# Patient Record
Sex: MALE | Race: WHITE | NOT HISPANIC OR LATINO | Employment: UNEMPLOYED | URBAN - METROPOLITAN AREA
[De-identification: names, ages, dates, MRNs, and addresses within clinical notes are randomized per-mention and may not be internally consistent; named-entity substitution may affect disease eponyms.]

---

## 2020-12-10 ENCOUNTER — NURSE TRIAGE (OUTPATIENT)
Dept: OTHER | Facility: OTHER | Age: 50
End: 2020-12-10

## 2020-12-10 DIAGNOSIS — Z20.822 CLOSE EXPOSURE TO COVID-19 VIRUS: Primary | ICD-10-CM

## 2020-12-11 DIAGNOSIS — Z20.822 CLOSE EXPOSURE TO COVID-19 VIRUS: ICD-10-CM

## 2020-12-11 PROCEDURE — U0003 INFECTIOUS AGENT DETECTION BY NUCLEIC ACID (DNA OR RNA); SEVERE ACUTE RESPIRATORY SYNDROME CORONAVIRUS 2 (SARS-COV-2) (CORONAVIRUS DISEASE [COVID-19]), AMPLIFIED PROBE TECHNIQUE, MAKING USE OF HIGH THROUGHPUT TECHNOLOGIES AS DESCRIBED BY CMS-2020-01-R: HCPCS | Performed by: FAMILY MEDICINE

## 2020-12-12 LAB — SARS-COV-2 RNA SPEC QL NAA+PROBE: NOT DETECTED

## 2021-03-26 DIAGNOSIS — Z23 ENCOUNTER FOR IMMUNIZATION: ICD-10-CM

## 2022-02-18 ENCOUNTER — OFFICE VISIT (OUTPATIENT)
Dept: UROLOGY | Facility: CLINIC | Age: 52
End: 2022-02-18
Payer: COMMERCIAL

## 2022-02-18 VITALS
DIASTOLIC BLOOD PRESSURE: 64 MMHG | BODY MASS INDEX: 31.18 KG/M2 | WEIGHT: 194 LBS | OXYGEN SATURATION: 97 % | SYSTOLIC BLOOD PRESSURE: 122 MMHG | HEIGHT: 66 IN | HEART RATE: 66 BPM | RESPIRATION RATE: 18 BRPM

## 2022-02-18 DIAGNOSIS — R97.20 ELEVATED PSA: Primary | ICD-10-CM

## 2022-02-18 PROCEDURE — 99203 OFFICE O/P NEW LOW 30 MIN: CPT | Performed by: PHYSICIAN ASSISTANT

## 2022-02-18 RX ORDER — OMEPRAZOLE 10 MG/1
10 CAPSULE, DELAYED RELEASE ORAL DAILY
COMMUNITY

## 2022-02-18 RX ORDER — MOMETASONE FUROATE 1 MG/G
CREAM TOPICAL AS NEEDED
COMMUNITY
Start: 2022-01-05

## 2022-02-18 NOTE — PROGRESS NOTES
1  Elevated PSA  PSA, total and free    PSA Total, Diagnostic     Assessment and plan:     1  Elevated PSA  - normal CARLOS  - repeat PSA - if elevated, then proceed with biopsy  Risks reviewed  If returns to normal, f/u 6 months repeat PSA for continued monitoring  Abdulaziz Rodriguez PA-C      Chief Complaint     Elevated PSA    History of Present Illness     Ap Hendrickson is a 54 y o  male presenting today as a new patient for elevated PSA  LUTS normal - no dysuria, gross hematuria, infection  Denies any family history of  malignancies  PSA 6 99 (1/18/22)  No previous PSA for comparison    Review of Systems     Review of Systems   Constitutional: Negative for activity change, appetite change, chills, diaphoresis, fatigue, fever and unexpected weight change  Respiratory: Negative for chest tightness and shortness of breath  Cardiovascular: Negative for chest pain, palpitations and leg swelling  Gastrointestinal: Negative for abdominal distention, abdominal pain, constipation, diarrhea, nausea and vomiting  Genitourinary: Negative for decreased urine volume, difficulty urinating, dysuria, enuresis, flank pain, frequency, genital sores, hematuria and urgency  Musculoskeletal: Negative for back pain, gait problem and myalgias  Skin: Negative for color change, pallor, rash and wound  Psychiatric/Behavioral: Negative for behavioral problems  The patient is not nervous/anxious            AUA SYMPTOM SCORE      Most Recent Value   AUA SYMPTOM SCORE    How often have you had a sensation of not emptying your bladder completely after you finished urinating? 0 (P)     How often have you had to urinate again less than two hours after you finished urinating? 1 (P)     How often have you found you stopped and started again several times when you urinate? 0 (P)     How often have you found it difficult to postpone urination? 0 (P)     How often have you had a weak urinary stream? 0 (P)     How often have you had to push or strain to begin urination? 0 (P)     How many times did you most typically get up to urinate from the time you went to bed at night until the time you got up in the morning? 1 (P)     Quality of Life: If you were to spend the rest of your life with your urinary condition just the way it is now, how would you feel about that? 0 (P)     AUA SYMPTOM SCORE 2 (P)           Allergies     No Known Allergies    Physical Exam     Physical Exam  Constitutional:       General: He is not in acute distress  Appearance: Normal appearance  He is normal weight  He is not ill-appearing, toxic-appearing or diaphoretic  HENT:      Head: Normocephalic and atraumatic  Eyes:      General:         Right eye: No discharge  Left eye: No discharge  Conjunctiva/sclera: Conjunctivae normal    Pulmonary:      Effort: Pulmonary effort is normal  No respiratory distress  Genitourinary:     Comments: Good rectal tone  Prostate 50g, smooth, no nodule  Musculoskeletal:         General: No swelling or tenderness  Normal range of motion  Skin:     General: Skin is warm and dry  Coloration: Skin is not jaundiced or pale  Neurological:      General: No focal deficit present  Mental Status: He is alert and oriented to person, place, and time  Psychiatric:         Mood and Affect: Mood normal          Behavior: Behavior normal          Thought Content: Thought content normal            Vital Signs     Vitals:    02/18/22 1123   BP: 122/64   Pulse: 66   Resp: 18   SpO2: 97%   Weight: 88 kg (194 lb)   Height: 5' 6" (1 676 m)         Current Medications       Current Outpatient Medications:     mometasone (ELOCON) 0 1 % cream, as needed  , Disp: , Rfl:     omeprazole (PriLOSEC) 10 mg delayed release capsule, Take 10 mg by mouth daily, Disp: , Rfl:       Active Problems     There is no problem list on file for this patient          Past Medical History     Past Medical History:   Diagnosis Date  Elevated PSA          Surgical History     History reviewed  No pertinent surgical history  Family History     History reviewed  No pertinent family history        Social History     Social History       Radiology

## 2022-03-06 ENCOUNTER — TELEPHONE (OUTPATIENT)
Dept: UROLOGY | Facility: CLINIC | Age: 52
End: 2022-03-06

## 2022-03-06 DIAGNOSIS — R97.20 ELEVATED PSA: Primary | ICD-10-CM

## 2022-03-06 LAB
PSA FREE MFR SERPL: 15 % (CALC)
PSA FREE SERPL-MCNC: 1.1 NG/ML
PSA SERPL-MCNC: 7.5 NG/ML

## 2022-03-06 RX ORDER — CIPROFLOXACIN 500 MG/1
500 TABLET, FILM COATED ORAL 2 TIMES DAILY
Qty: 2 TABLET | Refills: 0 | Status: SHIPPED | OUTPATIENT
Start: 2022-03-06 | End: 2022-03-07

## 2022-03-06 NOTE — TELEPHONE ENCOUNTER
Please let patient know PSA has risen further to 7 5 with a low percent free PSA  Needs a biopsy within the next few weeks  Risks already reviewed at his consultation  I will send antibiotic and enema to pharmacy  Please coordinate  Thank you

## 2022-03-07 NOTE — TELEPHONE ENCOUNTER
I scheduled pt for 4/1 and lm for pt to c/b and confirm  Please assist with scheduling the results apt

## 2022-03-07 NOTE — TELEPHONE ENCOUNTER
Called pt left vm injection 730am, bx at 8am on 4/1  Then on 4/18 @ 330pm bx results  Left all this information on voicemail

## 2022-03-07 NOTE — TELEPHONE ENCOUNTER
Pt returned call, unable to sched him for a biopsy soon, please advise on appt, pt prefers [facility] [place] office  Dr Perla Kessler has 04/01/22 at [place], not sure if this is too far out   Pt can be reached at [phone]

## 2022-03-07 NOTE — TELEPHONE ENCOUNTER
Unable to speak directly with patient  A generic message was left for patient to call off back  WHEN PATIENT RETURNS CALLS PLEASE ASSIST WITH THE FOLLOWING:  Alisa Longoria PA-C Yesterday (6:52 AM)        Please let patient know PSA has risen further to 7 5 with a low percent free PSA  Needs a biopsy within the next few weeks  Risks already reviewed at his consultation  I will send antibiotic and enema to pharmacy  Please coordinate  Thank you

## 2022-03-09 NOTE — TELEPHONE ENCOUNTER
Called and spoke with patient  Explained the procedure and explained the antibiotic that he take in the morning and then 12 hours later along with an enema in the morning to remove the stool burden  He understands  Mailed information on bx for him to also have and review

## 2022-03-31 ENCOUNTER — PROCEDURE VISIT (OUTPATIENT)
Dept: UROLOGY | Facility: AMBULATORY SURGERY CENTER | Age: 52
End: 2022-03-31
Payer: COMMERCIAL

## 2022-03-31 VITALS
SYSTOLIC BLOOD PRESSURE: 140 MMHG | WEIGHT: 193 LBS | DIASTOLIC BLOOD PRESSURE: 88 MMHG | HEART RATE: 60 BPM | BODY MASS INDEX: 31.02 KG/M2 | HEIGHT: 66 IN

## 2022-03-31 DIAGNOSIS — Z30.2 ENCOUNTER FOR STERILIZATION: ICD-10-CM

## 2022-03-31 DIAGNOSIS — R97.20 ELEVATED PSA: Primary | ICD-10-CM

## 2022-03-31 DIAGNOSIS — N20.0 CALCULUS OF KIDNEY: ICD-10-CM

## 2022-03-31 PROCEDURE — G0416 PROSTATE BIOPSY, ANY MTHD: HCPCS | Performed by: SPECIALIST

## 2022-03-31 PROCEDURE — 55700 PR BIOPSY OF PROSTATE,NEEDLE/PUNCH: CPT | Performed by: UROLOGY

## 2022-03-31 PROCEDURE — 88344 IMHCHEM/IMCYTCHM EA MLT ANTB: CPT | Performed by: SPECIALIST

## 2022-03-31 PROCEDURE — 96372 THER/PROPH/DIAG INJ SC/IM: CPT

## 2022-03-31 PROCEDURE — 76942 ECHO GUIDE FOR BIOPSY: CPT | Performed by: UROLOGY

## 2022-03-31 RX ORDER — CEFTRIAXONE 1 G/1
1000 INJECTION, POWDER, FOR SOLUTION INTRAMUSCULAR; INTRAVENOUS ONCE
Status: COMPLETED | OUTPATIENT
Start: 2022-03-31 | End: 2022-03-31

## 2022-03-31 RX ADMIN — CEFTRIAXONE 1000 MG: 1 INJECTION, POWDER, FOR SOLUTION INTRAMUSCULAR; INTRAVENOUS at 12:20

## 2022-03-31 NOTE — PROGRESS NOTES
Biopsy prostate     Date/Time 3/31/2022 1:10 PM     Performed by  Soha Johnson MD     Authorized by Soha Johnsno MD           Jim Gutierrez is a 44-year-old male referred for a PSA of 6 99 from January of 2022  A repeat PSA is noted to be 7 5 from March of 2022  He presents today to undergo transrectal ultrasound-guided biopsy the prostate  He denies any family history of prostate cancer  He denies any lower urinary tract symptoms  Prostate Biopsy note: The patient returns to the office today to undergo a transrectal ultrasound-guided biopsy of the prostate secondary to an elevated PSA  Risk and benefits of the procedure were discussed  Informed consent was obtained  The patient's prebiopsy preparation was deemed to be adequate  Antibiotics had been taken as prescribed  If appropriate blood thinners had been placed on hold  The patient completed an enema as prescribed  The patient was placed in the lateral decubitus position  Digital rectal examination was performed revealing a firm 50 gram prostate with increased induration along the left side  Viscous lidocaine jelly was instilled into the rectum  Transrectal ultrasonography was then performed  The prostate measured 40 grams  5 cc of 2% lidocaine were then injected bilaterally between the junction of the base of the prostate and the seminal vesicles  Ultrasound guidance was utilized to place the needle into proper position for the administration of the local anesthetic  A standard 12 core biopsy was then performed  Three cores were taken from each peripheral zone and 3 cores from each central zone bilaterally  Overall the patient tolerated the procedure and there were no complications  My overall impression status post transrectal ultrasound and biopsy the prostate secondary to an elevated PSA  I have recommended rest and avoiding heavy lifting or exertion for the next 48 hours    Possible postbiopsy sequelae were discussed including hematuria, blood per rectum, and hematospermia  We discussed that the hematospermia can often take a prolonged period of time to resolve  He will return in the next one to 2 weeks to review the results of the biopsy

## 2022-04-18 ENCOUNTER — OFFICE VISIT (OUTPATIENT)
Dept: UROLOGY | Facility: AMBULATORY SURGERY CENTER | Age: 52
End: 2022-04-18
Payer: COMMERCIAL

## 2022-04-18 VITALS
HEIGHT: 66 IN | SYSTOLIC BLOOD PRESSURE: 134 MMHG | HEART RATE: 68 BPM | DIASTOLIC BLOOD PRESSURE: 82 MMHG | BODY MASS INDEX: 31.34 KG/M2 | WEIGHT: 195 LBS

## 2022-04-18 DIAGNOSIS — C61 PROSTATE CANCER (HCC): Primary | ICD-10-CM

## 2022-04-18 PROCEDURE — 99214 OFFICE O/P EST MOD 30 MIN: CPT | Performed by: UROLOGY

## 2022-04-18 NOTE — H&P (VIEW-ONLY)
4/18/2022    Brand Kappa  1970  [id number]        Assessment  New diagnosis Jessee 3+4=7/10 prostate cancer      Discussion  I had a lengthy discussion with the patient and his wife in the office today regarding his 8 of 12 cores positive for a mix of [place] 6 and [place] 3 + 4 disease  His 3+4 disease is located exclusively on the left side of the prostate  At 54years of age I recommend definitive treatment of his [place] 7 prostate cancer with robot assisted laparoscopic prostatectomy  Although radiation therapy is an option, at 54years of age I would strongly recommend surgical extirpation of the prostate  We discussed that if he has surgery upfront and were to have disease recurrence in the future he could always receive adjuvant radiation therapy  If he were to receive radiation at this time and had disease recurrence in the future this would make surgery extremely difficult if not impossible to safely performed  The patient will way all of his options including the option for a 2nd opinion which we discussed in the office today  We will be in touch with the patient within the next week regarding his decision  History of Present Illness  54 y o  male with a history of an elevated PSA 7 5  This prompted transrectal ultrasound-guided biopsy the prostate which was recently performed  Eight of 12 cores were noted to be positive  Multiple cores of [place] 3 + 4 disease were positive on the left side of the prostate  The right side of the prostate has only [place] 6 disease present  He returns to the office today with his wife for discussion regarding his new diagnosis of [place] 7 prostate cancer  He denies any prior history of abdominal surgery  He states that he is otherwise in good health      AUA Symptom Score  AUA SYMPTOM SCORE      Most Recent Value   AUA SYMPTOM SCORE    How often have you had a sensation of not emptying your bladder completely after you finished urinating? 0 (P) How often have you had to urinate again less than two hours after you finished urinating? 1 (P)     How often have you found you stopped and started again several times when you urinate? 0 (P)     How often have you found it difficult to postpone urination? 0 (P)     How often have you had a weak urinary stream? 0 (P)     How often have you had to push or strain to begin urination? 0 (P)     How many times did you most typically get up to urinate from the time you went to bed at night until the time you got up in the morning? 1 (P)     Quality of Life: If you were to spend the rest of your life with your urinary condition just the way it is now, how would you feel about that? 0 (P)     AUA SYMPTOM SCORE 2 (P)           Review of Systems  Review of Systems   Constitutional: Negative  HENT: Negative  Eyes: Negative  Respiratory: Negative  Cardiovascular: Negative  Gastrointestinal: Negative  Endocrine: Negative  Genitourinary:        Per HPI   Musculoskeletal: Negative  Skin: Negative  Allergic/Immunologic: Negative  Neurological: Negative  Hematological: Negative  Psychiatric/Behavioral: Negative  Past Medical History  Past Medical History:   Diagnosis Date    Elevated PSA        Past Social History  History reviewed  No pertinent surgical history  Past Family History  History reviewed  No pertinent family history      Past Social history  Social History     Socioeconomic History    Marital status: Single     Spouse name: Not on file    Number of children: Not on file    Years of education: Not on file    Highest education level: Not on file   Occupational History    Not on file   Tobacco Use    Smoking status: Never Smoker    Smokeless tobacco: Never Used   Vaping Use    Vaping Use: Never used   Substance and Sexual Activity    Alcohol use: Yes     Comment: occasionally    Drug use: Never    Sexual activity: Not on file   Other Topics Concern    Not on file   Social History Narrative    Not on file     Social Determinants of Health     Financial Resource Strain: Not on file   Food Insecurity: Not on file   Transportation Needs: Not on file   Physical Activity: Not on file   Stress: Not on file   Social Connections: Not on file   Intimate Partner Violence: Not on file   Housing Stability: Not on file       Current Medications  Current Outpatient Medications   Medication Sig Dispense Refill    mometasone (ELOCON) 0 1 % cream as needed        omeprazole (PriLOSEC) 10 mg delayed release capsule Take 10 mg by mouth daily      bisacodyl (FLEET) 10 MG/30ML ENEM Insert 30 mL (10 mg total) into the rectum once for 1 dose Perform morning of biopsy (Patient not taking: Reported on 4/18/2022 ) 30 mL 0     No current facility-administered medications for this visit  Allergies  No Known Allergies    Past Medical History, Social History, Family History, medications and allergies were reviewed  Vitals  Vitals:    04/18/22 1536   BP: 134/82   BP Location: Left arm   Patient Position: Sitting   Cuff Size: Adult   Pulse: 68   Weight: 88 5 kg (195 lb)   Height: 5' 6" (1 676 m)       Physical Exam  Physical Exam  On examination he is in no acute distress  Gait normal   Affect normal      Results  Lab Results   Component Value Date    PSA 7 5 (H) 03/05/2022     No results found for: GLUCOSE, CALCIUM, NA, K, CO2, CL, BUN, CREATININE  No results found for: WBC, HGB, HCT, MCV, PLT      Office Urine Dip  No results found for this or any previous visit (from the past 1 hour(s))  ]      Total visit time was 30 minutes of which over 50% was spent on counseling

## 2022-04-18 NOTE — PROGRESS NOTES
4/18/2022    Emmit Quivers  1970  [id number]        Assessment  New diagnosis [place] 3+4=7/10 prostate cancer      Discussion  I had a lengthy discussion with the patient and his wife in the office today regarding his 8 of 12 cores positive for a mix of [place] 6 and [place] 3 + 4 disease  His 3+4 disease is located exclusively on the left side of the prostate  At 54years of age I recommend definitive treatment of his [place] 7 prostate cancer with robot assisted laparoscopic prostatectomy  Although radiation therapy is an option, at 54years of age I would strongly recommend surgical extirpation of the prostate  We discussed that if he has surgery upfront and were to have disease recurrence in the future he could always receive adjuvant radiation therapy  If he were to receive radiation at this time and had disease recurrence in the future this would make surgery extremely difficult if not impossible to safely performed  The patient will way all of his options including the option for a 2nd opinion which we discussed in the office today  We will be in touch with the patient within the next week regarding his decision  History of Present Illness  54 y o  male with a history of an elevated PSA 7 5  This prompted transrectal ultrasound-guided biopsy the prostate which was recently performed  Eight of 12 cores were noted to be positive  Multiple cores of [place] 3 + 4 disease were positive on the left side of the prostate  The right side of the prostate has only [place] 6 disease present  He returns to the office today with his wife for discussion regarding his new diagnosis of [place] 7 prostate cancer  He denies any prior history of abdominal surgery  He states that he is otherwise in good health      AUA Symptom Score  AUA SYMPTOM SCORE      Most Recent Value   AUA SYMPTOM SCORE    How often have you had a sensation of not emptying your bladder completely after you finished urinating? 0 (P) How often have you had to urinate again less than two hours after you finished urinating? 1 (P)     How often have you found you stopped and started again several times when you urinate? 0 (P)     How often have you found it difficult to postpone urination? 0 (P)     How often have you had a weak urinary stream? 0 (P)     How often have you had to push or strain to begin urination? 0 (P)     How many times did you most typically get up to urinate from the time you went to bed at night until the time you got up in the morning? 1 (P)     Quality of Life: If you were to spend the rest of your life with your urinary condition just the way it is now, how would you feel about that? 0 (P)     AUA SYMPTOM SCORE 2 (P)           Review of Systems  Review of Systems   Constitutional: Negative  HENT: Negative  Eyes: Negative  Respiratory: Negative  Cardiovascular: Negative  Gastrointestinal: Negative  Endocrine: Negative  Genitourinary:        Per HPI   Musculoskeletal: Negative  Skin: Negative  Allergic/Immunologic: Negative  Neurological: Negative  Hematological: Negative  Psychiatric/Behavioral: Negative  Past Medical History  Past Medical History:   Diagnosis Date    Elevated PSA        Past Social History  History reviewed  No pertinent surgical history  Past Family History  History reviewed  No pertinent family history      Past Social history  Social History     Socioeconomic History    Marital status: Single     Spouse name: Not on file    Number of children: Not on file    Years of education: Not on file    Highest education level: Not on file   Occupational History    Not on file   Tobacco Use    Smoking status: Never Smoker    Smokeless tobacco: Never Used   Vaping Use    Vaping Use: Never used   Substance and Sexual Activity    Alcohol use: Yes     Comment: occasionally    Drug use: Never    Sexual activity: Not on file   Other Topics Concern    Not on file   Social History Narrative    Not on file     Social Determinants of Health     Financial Resource Strain: Not on file   Food Insecurity: Not on file   Transportation Needs: Not on file   Physical Activity: Not on file   Stress: Not on file   Social Connections: Not on file   Intimate Partner Violence: Not on file   Housing Stability: Not on file       Current Medications  Current Outpatient Medications   Medication Sig Dispense Refill    mometasone (ELOCON) 0 1 % cream as needed        omeprazole (PriLOSEC) 10 mg delayed release capsule Take 10 mg by mouth daily      bisacodyl (FLEET) 10 MG/30ML ENEM Insert 30 mL (10 mg total) into the rectum once for 1 dose Perform morning of biopsy (Patient not taking: Reported on 4/18/2022 ) 30 mL 0     No current facility-administered medications for this visit  Allergies  No Known Allergies    Past Medical History, Social History, Family History, medications and allergies were reviewed  Vitals  Vitals:    04/18/22 1536   BP: 134/82   BP Location: Left arm   Patient Position: Sitting   Cuff Size: Adult   Pulse: 68   Weight: 88 5 kg (195 lb)   Height: 5' 6" (1 676 m)       Physical Exam  Physical Exam  On examination he is in no acute distress  Gait normal   Affect normal      Results  Lab Results   Component Value Date    PSA 7 5 (H) 03/05/2022     No results found for: GLUCOSE, CALCIUM, NA, K, CO2, CL, BUN, CREATININE  No results found for: WBC, HGB, HCT, MCV, PLT      Office Urine Dip  No results found for this or any previous visit (from the past 1 hour(s))  ]      Total visit time was 30 minutes of which over 50% was spent on counseling

## 2022-04-19 ENCOUNTER — TELEPHONE (OUTPATIENT)
Dept: UROLOGY | Facility: MEDICAL CENTER | Age: 52
End: 2022-04-19

## 2022-04-19 ENCOUNTER — PREP FOR PROCEDURE (OUTPATIENT)
Dept: UROLOGY | Facility: AMBULATORY SURGERY CENTER | Age: 52
End: 2022-04-19

## 2022-04-19 DIAGNOSIS — Z01.812 PRE-PROCEDURE LAB EXAM: ICD-10-CM

## 2022-04-19 DIAGNOSIS — Z79.01 LONG TERM (CURRENT) USE OF ANTICOAGULANTS: ICD-10-CM

## 2022-04-19 DIAGNOSIS — Z11.59 SCREENING FOR VIRAL DISEASE: ICD-10-CM

## 2022-04-19 DIAGNOSIS — C61 PROSTATE CANCER (HCC): Primary | ICD-10-CM

## 2022-04-19 DIAGNOSIS — Z01.818 PREOP EXAMINATION: ICD-10-CM

## 2022-04-19 RX ORDER — SODIUM CHLORIDE 9 MG/ML
125 INJECTION, SOLUTION INTRAVENOUS CONTINUOUS
Status: CANCELLED | OUTPATIENT
Start: 2022-04-19

## 2022-04-19 RX ORDER — CEFAZOLIN SODIUM 2 G/50ML
2000 SOLUTION INTRAVENOUS ONCE
Status: CANCELLED | OUTPATIENT
Start: 2022-04-19 | End: 2022-04-19

## 2022-04-19 NOTE — TELEPHONE ENCOUNTER
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

## 2022-04-19 NOTE — TELEPHONE ENCOUNTER
I called pt this afternoon to discuss scheduling his RALP with Dr Zo Cantor at the [facility] on 4/27/2022  Pt confirmed that this will work for him  Pt will also aware that he will need need to have his pre op labs and COVID test done as soon as possible  Pt will be stopping by our [place] [id number] office tomorrow to  his surgical paperwork and he was instructed to call our office with any questions or concerns regarding this procedure

## 2022-04-20 ENCOUNTER — APPOINTMENT (OUTPATIENT)
Dept: LAB | Age: 52
End: 2022-04-20
Payer: COMMERCIAL

## 2022-04-20 ENCOUNTER — TELEPHONE (OUTPATIENT)
Dept: UROLOGY | Facility: MEDICAL CENTER | Age: 52
End: 2022-04-20

## 2022-04-20 DIAGNOSIS — Z01.818 PREOP EXAMINATION: ICD-10-CM

## 2022-04-20 DIAGNOSIS — Z79.01 LONG TERM (CURRENT) USE OF ANTICOAGULANTS: ICD-10-CM

## 2022-04-20 DIAGNOSIS — Z01.812 PRE-PROCEDURE LAB EXAM: ICD-10-CM

## 2022-04-20 DIAGNOSIS — C61 PROSTATE CANCER (HCC): ICD-10-CM

## 2022-04-20 DIAGNOSIS — Z11.59 SCREENING FOR VIRAL DISEASE: ICD-10-CM

## 2022-04-20 PROCEDURE — 85610 PROTHROMBIN TIME: CPT

## 2022-04-20 PROCEDURE — 36415 COLL VENOUS BLD VENIPUNCTURE: CPT

## 2022-04-20 PROCEDURE — 85025 COMPLETE CBC W/AUTO DIFF WBC: CPT

## 2022-04-20 PROCEDURE — U0005 INFEC AGEN DETEC AMPLI PROBE: HCPCS

## 2022-04-20 PROCEDURE — 86920 COMPATIBILITY TEST SPIN: CPT

## 2022-04-20 PROCEDURE — U0003 INFECTIOUS AGENT DETECTION BY NUCLEIC ACID (DNA OR RNA); SEVERE ACUTE RESPIRATORY SYNDROME CORONAVIRUS 2 (SARS-COV-2) (CORONAVIRUS DISEASE [COVID-19]), AMPLIFIED PROBE TECHNIQUE, MAKING USE OF HIGH THROUGHPUT TECHNOLOGIES AS DESCRIBED BY CMS-2020-01-R: HCPCS

## 2022-04-20 PROCEDURE — 86850 RBC ANTIBODY SCREEN: CPT | Performed by: UROLOGY

## 2022-04-20 PROCEDURE — 85730 THROMBOPLASTIN TIME PARTIAL: CPT

## 2022-04-20 PROCEDURE — 86900 BLOOD TYPING SEROLOGIC ABO: CPT | Performed by: UROLOGY

## 2022-04-20 PROCEDURE — 86901 BLOOD TYPING SEROLOGIC RH(D): CPT | Performed by: UROLOGY

## 2022-04-20 PROCEDURE — 80048 BASIC METABOLIC PNL TOTAL CA: CPT

## 2022-04-20 NOTE — TELEPHONE ENCOUNTER
I returned call to pt but there was no answer  I left another voicemail asking pt to call our office back to discuss his questions

## 2022-04-20 NOTE — TELEPHONE ENCOUNTER
Patient called stating he wanted to speak to Surgical Scheduler       Patient can be reached [phone] (H)

## 2022-04-20 NOTE — TELEPHONE ENCOUNTER
DUPLICATE ENCOUNTER  PLEASE SEE OTHER ENCOUNTER FROM 4/19 TO UPDATE FOR UPCOMING SURGICAL PROCEDURE

## 2022-04-20 NOTE — TELEPHONE ENCOUNTER
Reshma Rouse routed conversation to You 1 hour ago (11:37 AM)     [facility] [phone]  Reshma Rouse 1 hour ago (11:37 AM)     Reshma Rouse 1 hour ago (11:36 AM)     [place]       Pt called to return  for Arelis     Pt call [facility]-[phone]               Documentation

## 2022-04-20 NOTE — TELEPHONE ENCOUNTER
I returned pt 's phone call as requested  There was no answer so I did leave a voicemail asking pt to call our office back if he still has questions regarding his upcoming procedure

## 2022-04-21 ENCOUNTER — LAB REQUISITION (OUTPATIENT)
Dept: LAB | Facility: HOSPITAL | Age: 52
End: 2022-04-21
Payer: COMMERCIAL

## 2022-04-21 DIAGNOSIS — C61 MALIGNANT NEOPLASM OF PROSTATE (HCC): ICD-10-CM

## 2022-04-21 LAB
ABO GROUP BLD: NORMAL
ANION GAP SERPL CALCULATED.3IONS-SCNC: 1 MMOL/L (ref 4–13)
APTT PPP: 32 SECONDS (ref 23–37)
BASOPHILS # BLD AUTO: 0.05 THOUSANDS/ΜL (ref 0–0.1)
BASOPHILS NFR BLD AUTO: 1 % (ref 0–1)
BLD GP AB SCN SERPL QL: NEGATIVE
BUN SERPL-MCNC: 19 MG/DL (ref 5–25)
CALCIUM SERPL-MCNC: 9.3 MG/DL (ref 8.3–10.1)
CHLORIDE SERPL-SCNC: 107 MMOL/L (ref 100–108)
CO2 SERPL-SCNC: 30 MMOL/L (ref 21–32)
CREAT SERPL-MCNC: 1.06 MG/DL (ref 0.6–1.3)
EOSINOPHIL # BLD AUTO: 0.1 THOUSAND/ΜL (ref 0–0.61)
EOSINOPHIL NFR BLD AUTO: 1 % (ref 0–6)
ERYTHROCYTE [DISTWIDTH] IN BLOOD BY AUTOMATED COUNT: 12 % (ref 11.6–15.1)
GFR SERPL CREATININE-BSD FRML MDRD: 82 ML/MIN/1.73SQ M
GLUCOSE SERPL-MCNC: 86 MG/DL (ref 65–140)
HCT VFR BLD AUTO: 42.3 % (ref 36.5–49.3)
HGB BLD-MCNC: 14.3 G/DL (ref 12–17)
IMM GRANULOCYTES # BLD AUTO: 0.03 THOUSAND/UL (ref 0–0.2)
IMM GRANULOCYTES NFR BLD AUTO: 0 % (ref 0–2)
INR PPP: 0.99 (ref 0.84–1.19)
LYMPHOCYTES # BLD AUTO: 3.21 THOUSANDS/ΜL (ref 0.6–4.47)
LYMPHOCYTES NFR BLD AUTO: 33 % (ref 14–44)
MCH RBC QN AUTO: 28.9 PG (ref 26.8–34.3)
MCHC RBC AUTO-ENTMCNC: 33.8 G/DL (ref 31.4–37.4)
MCV RBC AUTO: 86 FL (ref 82–98)
MONOCYTES # BLD AUTO: 0.84 THOUSAND/ΜL (ref 0.17–1.22)
MONOCYTES NFR BLD AUTO: 9 % (ref 4–12)
NEUTROPHILS # BLD AUTO: 5.57 THOUSANDS/ΜL (ref 1.85–7.62)
NEUTS SEG NFR BLD AUTO: 56 % (ref 43–75)
NRBC BLD AUTO-RTO: 0 /100 WBCS
PLATELET # BLD AUTO: 242 THOUSANDS/UL (ref 149–390)
PMV BLD AUTO: 10.2 FL (ref 8.9–12.7)
POTASSIUM SERPL-SCNC: 4 MMOL/L (ref 3.5–5.3)
PROTHROMBIN TIME: 12.7 SECONDS (ref 11.6–14.5)
RBC # BLD AUTO: 4.94 MILLION/UL (ref 3.88–5.62)
RH BLD: POSITIVE
SARS-COV-2 RNA RESP QL NAA+PROBE: NEGATIVE
SODIUM SERPL-SCNC: 138 MMOL/L (ref 136–145)
SPECIMEN EXPIRATION DATE: NORMAL
WBC # BLD AUTO: 9.8 THOUSAND/UL (ref 4.31–10.16)

## 2022-04-25 ENCOUNTER — TELEPHONE (OUTPATIENT)
Dept: UROLOGY | Facility: AMBULATORY SURGERY CENTER | Age: 52
End: 2022-04-25

## 2022-04-25 NOTE — TELEPHONE ENCOUNTER
Savanna Carrillo routed conversation to You 35 minutes ago (8:42 AM)     Savanna Carrillo 35 minutes ago (8:42 AM)     KR    Summary: Surgery Question       PT under care of: Dr Jo Fields     Pt has surgery on 04/27/22     PT calling today because/symptoms are: patient's wife has some questions about the surgery  She wants to know what the cost is for insurance and she wants to  Know if there are any out of pocket expenses and if so approximately how much       PT can be reached at: Charles Cordova can be reached at [phone]               Documentation      Cherie Clark 36 minutes ago (8:41 AM)

## 2022-04-25 NOTE — TELEPHONE ENCOUNTER
PT under care of: Dr Mallory Alba     Pt has surgery on 04/27/22     PT calling today because/symptoms are: patient's wife has some questions about the surgery  She wants to know what the cost is for insurance and she wants to  Know if there are any out of pocket expenses and if so approximately how much       PT can be reached at: Sunil Ackerman can be reached at [phone]

## 2022-04-25 NOTE — PRE-PROCEDURE INSTRUCTIONS
Pre-Surgery Instructions:   Medication Instructions    bisacodyl (FLEET) 10 MG/30ML ENEM Instructions from surgeon office    mometasone (ELOCON) 0 1 % cream Do not use night before or DOS    Multiple Vitamin (MULTIVITAMIN ADULT PO) Last dose 4/18    omeprazole (PriLOSEC) 10 mg delayed release capsule Ok to take DOS with sips of water       Spoke with pt via phone, COVID screening negative  Advised hospital location will call with arrival time night before surgery and NPO after midnight night before  Advised one vaccinated visitor is allowed to accompany pt to wait during the procedure  Instructed to leave contacts/jewelery/valuables at home  Instructed to avoid all ASA and OTC Vit/Supp  Tylenol ok to take prn  Reviewed above medication instructions, showering instructions and prostatectomy class, including delgado care and IS  Patient verbalized understanding of all of the above

## 2022-04-27 ENCOUNTER — ANESTHESIA EVENT (OUTPATIENT)
Dept: PERIOP | Facility: HOSPITAL | Age: 52
End: 2022-04-27
Payer: COMMERCIAL

## 2022-04-27 ENCOUNTER — ANESTHESIA (OUTPATIENT)
Dept: PERIOP | Facility: HOSPITAL | Age: 52
End: 2022-04-27
Payer: COMMERCIAL

## 2022-04-27 ENCOUNTER — HOSPITAL ENCOUNTER (OUTPATIENT)
Facility: HOSPITAL | Age: 52
Setting detail: OUTPATIENT SURGERY
Discharge: HOME | End: 2022-04-28
Attending: UROLOGY | Admitting: UROLOGY
Payer: COMMERCIAL

## 2022-04-27 DIAGNOSIS — C61 PROSTATE CANCER (HCC): ICD-10-CM

## 2022-04-27 LAB
ABO GROUP BLD: NORMAL
ANION GAP SERPL CALCULATED.3IONS-SCNC: 2 MMOL/L (ref 4–13)
BUN SERPL-MCNC: 14 MG/DL (ref 5–25)
CALCIUM SERPL-MCNC: 8.4 MG/DL (ref 8.3–10.1)
CHLORIDE SERPL-SCNC: 110 MMOL/L (ref 100–108)
CO2 SERPL-SCNC: 27 MMOL/L (ref 21–32)
CREAT SERPL-MCNC: 1.11 MG/DL (ref 0.6–1.3)
ERYTHROCYTE [DISTWIDTH] IN BLOOD BY AUTOMATED COUNT: 12 % (ref 11.6–15.1)
GFR SERPL CREATININE-BSD FRML MDRD: 77 ML/MIN/1.73SQ M
GLUCOSE P FAST SERPL-MCNC: 136 MG/DL (ref 65–99)
GLUCOSE SERPL-MCNC: 136 MG/DL (ref 65–140)
HCT VFR BLD AUTO: 41.6 % (ref 36.5–49.3)
HGB BLD-MCNC: 13.6 G/DL (ref 12–17)
MCH RBC QN AUTO: 29.4 PG (ref 26.8–34.3)
MCHC RBC AUTO-ENTMCNC: 32.7 G/DL (ref 31.4–37.4)
MCV RBC AUTO: 90 FL (ref 82–98)
PLATELET # BLD AUTO: 218 THOUSANDS/UL (ref 149–390)
PMV BLD AUTO: 8.9 FL (ref 8.9–12.7)
POTASSIUM SERPL-SCNC: 4.2 MMOL/L (ref 3.5–5.3)
RBC # BLD AUTO: 4.62 MILLION/UL (ref 3.88–5.62)
RH BLD: POSITIVE
SODIUM SERPL-SCNC: 139 MMOL/L (ref 136–145)
WBC # BLD AUTO: 27.22 THOUSAND/UL (ref 4.31–10.16)

## 2022-04-27 PROCEDURE — 88309 TISSUE EXAM BY PATHOLOGIST: CPT | Performed by: SPECIALIST

## 2022-04-27 PROCEDURE — 55866 LAPS SURG PRST8ECT RPBIC RAD: CPT | Performed by: UROLOGY

## 2022-04-27 PROCEDURE — 88341 IMHCHEM/IMCYTCHM EA ADD ANTB: CPT | Performed by: SPECIALIST

## 2022-04-27 PROCEDURE — 38571 LAPAROSCOPY LYMPHADENECTOMY: CPT | Performed by: PHYSICIAN ASSISTANT

## 2022-04-27 PROCEDURE — 88342 IMHCHEM/IMCYTCHM 1ST ANTB: CPT | Performed by: SPECIALIST

## 2022-04-27 PROCEDURE — S2900 ROBOTIC SURGICAL SYSTEM: HCPCS | Performed by: UROLOGY

## 2022-04-27 PROCEDURE — 85027 COMPLETE CBC AUTOMATED: CPT | Performed by: UROLOGY

## 2022-04-27 PROCEDURE — 88305 TISSUE EXAM BY PATHOLOGIST: CPT | Performed by: SPECIALIST

## 2022-04-27 PROCEDURE — 55866 LAPS SURG PRST8ECT RPBIC RAD: CPT | Performed by: PHYSICIAN ASSISTANT

## 2022-04-27 PROCEDURE — 80048 BASIC METABOLIC PNL TOTAL CA: CPT | Performed by: UROLOGY

## 2022-04-27 PROCEDURE — 38571 LAPAROSCOPY LYMPHADENECTOMY: CPT | Performed by: UROLOGY

## 2022-04-27 RX ORDER — DEXTROSE, SODIUM CHLORIDE, SODIUM LACTATE, POTASSIUM CHLORIDE, AND CALCIUM CHLORIDE 5; .6; .31; .03; .02 G/100ML; G/100ML; G/100ML; G/100ML; G/100ML
125 INJECTION, SOLUTION INTRAVENOUS CONTINUOUS
Status: DISCONTINUED | OUTPATIENT
Start: 2022-04-27 | End: 2022-04-28 | Stop reason: ALTCHOICE

## 2022-04-27 RX ORDER — SODIUM CHLORIDE 9 MG/ML
INJECTION, SOLUTION INTRAVENOUS CONTINUOUS PRN
Status: DISCONTINUED | OUTPATIENT
Start: 2022-04-27 | End: 2022-04-27

## 2022-04-27 RX ORDER — MAGNESIUM HYDROXIDE 1200 MG/15ML
LIQUID ORAL AS NEEDED
Status: DISCONTINUED | OUTPATIENT
Start: 2022-04-27 | End: 2022-04-27 | Stop reason: HOSPADM

## 2022-04-27 RX ORDER — SODIUM CHLORIDE 9 MG/ML
125 INJECTION, SOLUTION INTRAVENOUS CONTINUOUS
Status: DISCONTINUED | OUTPATIENT
Start: 2022-04-27 | End: 2022-04-28 | Stop reason: ALTCHOICE

## 2022-04-27 RX ORDER — CEFAZOLIN SODIUM 1 G/3ML
INJECTION, POWDER, FOR SOLUTION INTRAMUSCULAR; INTRAVENOUS AS NEEDED
Status: DISCONTINUED | OUTPATIENT
Start: 2022-04-27 | End: 2022-04-27

## 2022-04-27 RX ORDER — ONDANSETRON 2 MG/ML
4 INJECTION INTRAMUSCULAR; INTRAVENOUS ONCE AS NEEDED
Status: DISCONTINUED | OUTPATIENT
Start: 2022-04-27 | End: 2022-04-27 | Stop reason: HOSPADM

## 2022-04-27 RX ORDER — ONDANSETRON 2 MG/ML
4 INJECTION INTRAMUSCULAR; INTRAVENOUS EVERY 6 HOURS PRN
Status: DISCONTINUED | OUTPATIENT
Start: 2022-04-27 | End: 2022-04-28 | Stop reason: HOSPADM

## 2022-04-27 RX ORDER — CEFAZOLIN SODIUM 2 G/50ML
2000 SOLUTION INTRAVENOUS ONCE
Status: DISCONTINUED | OUTPATIENT
Start: 2022-04-27 | End: 2022-04-27 | Stop reason: HOSPADM

## 2022-04-27 RX ORDER — DOCUSATE SODIUM 100 MG/1
100 CAPSULE, LIQUID FILLED ORAL 2 TIMES DAILY
Status: DISCONTINUED | OUTPATIENT
Start: 2022-04-27 | End: 2022-04-28 | Stop reason: HOSPADM

## 2022-04-27 RX ORDER — FENTANYL CITRATE 50 UG/ML
INJECTION, SOLUTION INTRAMUSCULAR; INTRAVENOUS AS NEEDED
Status: DISCONTINUED | OUTPATIENT
Start: 2022-04-27 | End: 2022-04-27

## 2022-04-27 RX ORDER — ROCURONIUM BROMIDE 10 MG/ML
INJECTION, SOLUTION INTRAVENOUS AS NEEDED
Status: DISCONTINUED | OUTPATIENT
Start: 2022-04-27 | End: 2022-04-27

## 2022-04-27 RX ORDER — MORPHINE SULFATE 4 MG/ML
4 INJECTION, SOLUTION INTRAMUSCULAR; INTRAVENOUS EVERY 2 HOUR PRN
Status: DISCONTINUED | OUTPATIENT
Start: 2022-04-27 | End: 2022-04-28 | Stop reason: HOSPADM

## 2022-04-27 RX ORDER — ACETAMINOPHEN 325 MG/1
650 TABLET ORAL EVERY 6 HOURS SCHEDULED
Status: DISCONTINUED | OUTPATIENT
Start: 2022-04-27 | End: 2022-04-28 | Stop reason: HOSPADM

## 2022-04-27 RX ORDER — BUPIVACAINE HYDROCHLORIDE 5 MG/ML
INJECTION, SOLUTION PERINEURAL AS NEEDED
Status: DISCONTINUED | OUTPATIENT
Start: 2022-04-27 | End: 2022-04-27 | Stop reason: HOSPADM

## 2022-04-27 RX ORDER — PROPOFOL 10 MG/ML
INJECTION, EMULSION INTRAVENOUS AS NEEDED
Status: DISCONTINUED | OUTPATIENT
Start: 2022-04-27 | End: 2022-04-27

## 2022-04-27 RX ORDER — HYDROCODONE BITARTRATE AND ACETAMINOPHEN 5; 325 MG/1; MG/1
2 TABLET ORAL EVERY 4 HOURS PRN
Status: DISCONTINUED | OUTPATIENT
Start: 2022-04-27 | End: 2022-04-28 | Stop reason: HOSPADM

## 2022-04-27 RX ORDER — GABAPENTIN 300 MG/1
300 CAPSULE ORAL
Status: DISCONTINUED | OUTPATIENT
Start: 2022-04-27 | End: 2022-04-28 | Stop reason: HOSPADM

## 2022-04-27 RX ORDER — SODIUM CHLORIDE, SODIUM LACTATE, POTASSIUM CHLORIDE, CALCIUM CHLORIDE 600; 310; 30; 20 MG/100ML; MG/100ML; MG/100ML; MG/100ML
INJECTION, SOLUTION INTRAVENOUS CONTINUOUS PRN
Status: DISCONTINUED | OUTPATIENT
Start: 2022-04-27 | End: 2022-04-27

## 2022-04-27 RX ORDER — BACITRACIN, NEOMYCIN, POLYMYXIN B 400; 3.5; 5 [USP'U]/G; MG/G; [USP'U]/G
1 OINTMENT TOPICAL 2 TIMES DAILY
Status: DISCONTINUED | OUTPATIENT
Start: 2022-04-27 | End: 2022-04-28 | Stop reason: HOSPADM

## 2022-04-27 RX ORDER — HYDROCODONE BITARTRATE AND ACETAMINOPHEN 5; 325 MG/1; MG/1
1 TABLET ORAL EVERY 4 HOURS PRN
Status: DISCONTINUED | OUTPATIENT
Start: 2022-04-27 | End: 2022-04-28 | Stop reason: HOSPADM

## 2022-04-27 RX ORDER — ONDANSETRON 2 MG/ML
INJECTION INTRAMUSCULAR; INTRAVENOUS AS NEEDED
Status: DISCONTINUED | OUTPATIENT
Start: 2022-04-27 | End: 2022-04-27

## 2022-04-27 RX ORDER — MIDAZOLAM HYDROCHLORIDE 2 MG/2ML
INJECTION, SOLUTION INTRAMUSCULAR; INTRAVENOUS AS NEEDED
Status: DISCONTINUED | OUTPATIENT
Start: 2022-04-27 | End: 2022-04-27

## 2022-04-27 RX ORDER — PANTOPRAZOLE SODIUM 20 MG/1
20 TABLET, DELAYED RELEASE ORAL
Status: DISCONTINUED | OUTPATIENT
Start: 2022-04-28 | End: 2022-04-28 | Stop reason: HOSPADM

## 2022-04-27 RX ORDER — KETOROLAC TROMETHAMINE 30 MG/ML
15 INJECTION, SOLUTION INTRAMUSCULAR; INTRAVENOUS EVERY 6 HOURS SCHEDULED
Status: DISCONTINUED | OUTPATIENT
Start: 2022-04-27 | End: 2022-04-28 | Stop reason: HOSPADM

## 2022-04-27 RX ORDER — SODIUM CHLORIDE, SODIUM LACTATE, POTASSIUM CHLORIDE, CALCIUM CHLORIDE 600; 310; 30; 20 MG/100ML; MG/100ML; MG/100ML; MG/100ML
125 INJECTION, SOLUTION INTRAVENOUS CONTINUOUS
Status: DISCONTINUED | OUTPATIENT
Start: 2022-04-27 | End: 2022-04-28 | Stop reason: ALTCHOICE

## 2022-04-27 RX ORDER — KETAMINE HCL IN NACL, ISO-OSM 100MG/10ML
SYRINGE (ML) INJECTION AS NEEDED
Status: DISCONTINUED | OUTPATIENT
Start: 2022-04-27 | End: 2022-04-27

## 2022-04-27 RX ORDER — GLYCOPYRROLATE 0.2 MG/ML
INJECTION INTRAMUSCULAR; INTRAVENOUS AS NEEDED
Status: DISCONTINUED | OUTPATIENT
Start: 2022-04-27 | End: 2022-04-27

## 2022-04-27 RX ORDER — FENTANYL CITRATE/PF 50 MCG/ML
25 SYRINGE (ML) INJECTION
Status: DISCONTINUED | OUTPATIENT
Start: 2022-04-27 | End: 2022-04-27 | Stop reason: HOSPADM

## 2022-04-27 RX ORDER — LIDOCAINE HYDROCHLORIDE 10 MG/ML
INJECTION, SOLUTION EPIDURAL; INFILTRATION; INTRACAUDAL; PERINEURAL AS NEEDED
Status: DISCONTINUED | OUTPATIENT
Start: 2022-04-27 | End: 2022-04-27

## 2022-04-27 RX ORDER — HYDROMORPHONE HCL 110MG/55ML
PATIENT CONTROLLED ANALGESIA SYRINGE INTRAVENOUS AS NEEDED
Status: DISCONTINUED | OUTPATIENT
Start: 2022-04-27 | End: 2022-04-27

## 2022-04-27 RX ORDER — HYDROMORPHONE HCL/PF 1 MG/ML
0.5 SYRINGE (ML) INJECTION
Status: DISCONTINUED | OUTPATIENT
Start: 2022-04-27 | End: 2022-04-27 | Stop reason: HOSPADM

## 2022-04-27 RX ADMIN — DOCUSATE SODIUM 100 MG: 100 CAPSULE, LIQUID FILLED ORAL at 21:43

## 2022-04-27 RX ADMIN — LIDOCAINE HYDROCHLORIDE 50 MG: 10 INJECTION, SOLUTION EPIDURAL; INFILTRATION; INTRACAUDAL; PERINEURAL at 13:30

## 2022-04-27 RX ADMIN — SODIUM CHLORIDE: 0.9 INJECTION, SOLUTION INTRAVENOUS at 13:36

## 2022-04-27 RX ADMIN — PHENYLEPHRINE HYDROCHLORIDE 20 MCG/MIN: 10 INJECTION INTRAVENOUS at 14:02

## 2022-04-27 RX ADMIN — PROPOFOL 200 MG: 10 INJECTION, EMULSION INTRAVENOUS at 13:30

## 2022-04-27 RX ADMIN — MIDAZOLAM 2 MG: 1 INJECTION INTRAMUSCULAR; INTRAVENOUS at 13:21

## 2022-04-27 RX ADMIN — SUGAMMADEX 177 MG: 100 INJECTION, SOLUTION INTRAVENOUS at 17:04

## 2022-04-27 RX ADMIN — HYDROMORPHONE HYDROCHLORIDE 0.5 MG: 2 INJECTION, SOLUTION INTRAMUSCULAR; INTRAVENOUS; SUBCUTANEOUS at 16:06

## 2022-04-27 RX ADMIN — FENTANYL CITRATE 50 MCG: 50 INJECTION INTRAMUSCULAR; INTRAVENOUS at 13:30

## 2022-04-27 RX ADMIN — SODIUM CHLORIDE, SODIUM LACTATE, POTASSIUM CHLORIDE, AND CALCIUM CHLORIDE: .6; .31; .03; .02 INJECTION, SOLUTION INTRAVENOUS at 13:19

## 2022-04-27 RX ADMIN — ROCURONIUM BROMIDE 20 MG: 50 INJECTION INTRAVENOUS at 14:06

## 2022-04-27 RX ADMIN — GABAPENTIN 300 MG: 300 CAPSULE ORAL at 21:44

## 2022-04-27 RX ADMIN — ROCURONIUM BROMIDE 50 MG: 50 INJECTION INTRAVENOUS at 13:30

## 2022-04-27 RX ADMIN — ENOXAPARIN SODIUM 40 MG: 40 INJECTION SUBCUTANEOUS at 21:49

## 2022-04-27 RX ADMIN — KETOROLAC TROMETHAMINE 15 MG: 30 INJECTION, SOLUTION INTRAMUSCULAR at 21:45

## 2022-04-27 RX ADMIN — SODIUM CHLORIDE, SODIUM LACTATE, POTASSIUM CHLORIDE, AND CALCIUM CHLORIDE 125 ML/HR: .6; .31; .03; .02 INJECTION, SOLUTION INTRAVENOUS at 11:40

## 2022-04-27 RX ADMIN — ROCURONIUM BROMIDE 20 MG: 50 INJECTION INTRAVENOUS at 14:52

## 2022-04-27 RX ADMIN — CEFAZOLIN 2000 MG: 1 INJECTION, POWDER, FOR SOLUTION INTRAMUSCULAR; INTRAVENOUS at 13:49

## 2022-04-27 RX ADMIN — GLYCOPYRROLATE 0.1 MG: 0.2 INJECTION, SOLUTION INTRAMUSCULAR; INTRAVENOUS at 13:55

## 2022-04-27 RX ADMIN — FENTANYL CITRATE 50 MCG: 50 INJECTION INTRAMUSCULAR; INTRAVENOUS at 14:01

## 2022-04-27 RX ADMIN — Medication 50 MG: at 13:48

## 2022-04-27 RX ADMIN — ROCURONIUM BROMIDE 10 MG: 50 INJECTION INTRAVENOUS at 16:34

## 2022-04-27 RX ADMIN — ROCURONIUM BROMIDE 20 MG: 50 INJECTION INTRAVENOUS at 15:41

## 2022-04-27 RX ADMIN — ONDANSETRON 4 MG: 2 INJECTION INTRAMUSCULAR; INTRAVENOUS at 16:52

## 2022-04-27 RX ADMIN — ACETAMINOPHEN 650 MG: 325 TABLET ORAL at 21:43

## 2022-04-27 RX ADMIN — DEXTROSE, SODIUM CHLORIDE, SODIUM LACTATE, POTASSIUM CHLORIDE, AND CALCIUM CHLORIDE 125 ML/HR: 5; .6; .31; .03; .02 INJECTION, SOLUTION INTRAVENOUS at 21:40

## 2022-04-27 NOTE — INTERVAL H&P NOTE
H&P reviewed  After examining the patient I find no changes in the patients condition since the H&P had been written  Vitals:    04/27/22 1127   BP: 140/86   Pulse: 76   Resp: 16   Temp: 97 8 °F (36 6 °C)   SpO2: 100%     On examination he is in no acute distress  Respiratory without distress  Cardiac is regular  HEENT normocephalic atraumatic, sclerae anicteric  Abdomen soft nontender nondistended  Skin is warm  Extremities without edema  Afshan Loco is a 73-year-old male found to have an elevated PSA 7 5  This prompted transrectal ultrasound-guided biopsy the prostate which reveals [place] 3+4 disease identified on the left side of the prostate  On the right side of the prostate there was [place] 3 + 3 disease  He has good erections  He denies any prior history of abdominal surgery  Impression:  J[place] 7 prostate cancer    Plan:  I recommend robot assisted laparoscopic prostatectomy bilateral pelvic lymph node dissection  Risk and benefits of the procedure were again discussed reviewed  Risks including, but not limited to, intraoperative injury, injury to the rectum/vascular structures, anastomotic leak, anastomotic stricture, disease recurrence, need for additional treatment, erectile dysfunction, and incontinence were discussed reviewed  Informed consent was obtained  I will attempt to perform a nerve spare on the right side with a [place] 6 disease is identified but have recommended wide resection on the left side were 3+4 disease is  The patient is amenable with this plan

## 2022-04-27 NOTE — OP NOTE
OPERATIVE REPORT  PATIENT NAME: Herbert Avalos    :  1970  MRN: [id number]  Pt Location: [facility] OR ROOM 14    SURGERY DATE: 2022    Surgeon(s) and Role:     * Adelina Godoy MD - Primary     * Navid Abbott PA-C - Assisting     * Manjeet Del Rio - Assisting     * Baldomero Fregoso PA-C    No qualified  was available for the small case  The advanced practitioners were necessary and required assistant for help with suctioning, retraction, and passage of instruments  Preop Diagnosis:  Prostate cancer ([address] ) [place]    Post-Op Diagnosis Codes:     * Prostate cancer ([address] ) [place]    Procedure(s) (LRB):  PROSTATECTOMY RADICAL LAPAROSCOPIC  W ROBOTICS, BPLND (N/A)    Specimen(s):  ID Type Source Tests Collected by Time Destination   1 : right pelvic lymph node  Tissue Lymph Node TISSUE EXAM Adelina Godoy MD 2022 1550    2 : left pelvic lymph node  Tissue Lymph Node TISSUE EXAM Adelina Godoy MD 2022 1553    3 : prostate and seminal vessicles  Tissue Prostate TISSUE EXAM Adelina Godoy MD 2022 1656        Estimated Blood Loss:   150 cc    Drains:  Urethral Catheter Latex 22 Fr  (Active)   Number of days: 0       [REMOVED] Urethral Catheter Double-lumen; Latex 18 Fr  (Removed)   Number of days: 0       Anesthesia Type:   General    Operative Indications:  Prostate cancer ([address] ) [place]      Operative Findings:  Standard robot assisted laparoscopic prostatectomy with unilateral right nerve spare and bilateral pelvic lymph node dissection    Complications:   None    Procedure and Technique:  Sachin Friend is a 61-year-old male with a history of [place] 3+4 prostate cancer identified in the left side of the prostate  Small volume 3+3 disease is identified on the right  Risk and benefits of da Jerri robot-assisted laparoscopic prostatectomy with bilateral pelvic lymph node dissection were discussed and reviewed    Informed consent was obtained and the patient was brought to the operating room on April 27, 2022  After the smooth induction of general endotracheal anesthesia, the patient was placed in a low lithotomy position  Venodyne boots were applied  Pressure points were padded  The patient was secured to the bed with padding, towels, and tape  Intravenous antibiotics were administered  A timeout was performed with all members of the operative team confirming the patient's identity and procedure to be performed  A delgado catheter was placed at the start of the case  An 8 mm supra-umbilical skin incision was made  The skin was elevated  A Veress needle was utilized to create a pneumoperitoneum  An 8 mm robotic camera port was then placed  The patient was placed into steep Trendelenburg  2 additional 8 mm robotic working robotic ports were placed at the level of the umbilicus and approximately 4 fingerbreadths lateral to the umbilicus  An additional left mid quadrant robotic port was along with a  12 mm assistant port in the right mid abdomen  The robot was docked  The urachal structures were taken down  The bladder was dropped and the space of Retzius was developed utilizing monopolar scissors and a fenestrated bipolar dissector  Fibrofatty tissue was cleaned from the dorsum of the prostate and the endopelvic fascia  A large superficial venous complex was identified on the dorsum of the prostate  This was taken with bipolar electro dissection  I then opened the endopelvic fascia bilaterally from base to apex first on the right and then on the left  I dropped the puboprostatic ligaments  I exposed the dorsal venous complex  Muscular fibers were swept off the dorsal venous complex to expose the notch between the DVC and the urethra  The dorsal venous complex was ligated with 0 Vicryl x2 figure-of-eight sutures    Next I focused my attention on the prostatic vesical junction    Hot and cold scissor dissection was utilized to create a plane between the bladder and prostate until the Ballard catheter was identified in the midline  The balloon was deflated area the catheter was brought into the surgical field and placed on tension  The posterior bladder neck was taken with hot scissor dissection  A plane was now created behind the bladder and beneath the prostate until denonvilles fascia was identified  I then identified the vasa deferentia  Bilateral vasa deferentia were dissected proximally and transected  I then used each vas as a handle to provide traction to dissect out the ipsilateral seminal vesicle  Both seminal vesicles were dissected to their tip  I then performed exclusive sharp dissection beneath the prostate creating a plane between the prostate and the rectum  Attention was now focused on the left vascular pedicle  The left side was taken with fenestrated bipolar dissection along with hot and cold scissor dissection  I discussed with the patient performing a non-nerve sparing approach on the left side  Dissection was then performed from base to apex  Attention was now focused on the right vascular pedicle  Weck clips x2 were placed on the vascular pedicle and then the lateral prostatic fascia on the right side was opened sharply with scissors down to the level of the prostatic capsule which was visualized but not violated  I then swept the neurovascular tissue laterally off the right side of the prostate from base to apex and utilized sharp scissor dissection with point electrocautery as needed  The last remaining attachments were the dorsal venous complex and the urethra  The dorsal venous complex was taken with hot scissors on the left and cold scissor dissection on the right  I then placed the prostate on traction and identified the urethra  The urethra was taken sharply with scissors  The Ballard catheter was pulled back and the posterior urethra was taken with sharp scissors as well    The last remaining attachments of the rectourethralis muscle were taken with sharp scissors  At this point this specimen was completely free within the pelvis and placed into an Endo Catch bag  The assistant performed a digital rectal examination and the rectum was intact  Attention was focused on performing the pelvic lymph node dissection  I first focused on the right side  The external iliac vein was identified and skeletonized  The pelvic lymph node packet adjacent to the pelvic sidewall was elevated off of the sidewall and dissected free from the surrounding tissue  The obturator nerve was identified and preserved  The lymph node packet was completely dissected and removed from the surgical field and sent for permanent specimen  The same procedure was then repeated on the left side  I then performed the anastomosis between the bladder and urethra  The bladder neck did not require calibration  A running 2 0 V lock Tani stitch was placed to reapproximate the posterior bladder with the rectal urethralis  I then began the anastomosis  I started with 2 V lock sutures placed in an outside in fashion on the bladder neck posteriorly  The anastomosis was performed in an inside out fashion on the urethra and an outside in fashion on the bladder  Once the 2 sutures were placed in either side tension was carefully taken off of the sutures until there was excellent reapproximation of the posterior urethra to the posterior bladder neck  I then completed approximately two thirds of the anastomosis in this fashion  I ultimately locked suture on the bladder side and completed the anastomosis in an outside in fashion on the urethra and in an inside out fashion on the bladder  Prior to completing the anastomosis a new Ballard catheter was placed under vision  The anastomosis was completed and the sutures were secured  15 cc were placed into the balloon    The catheter was placed on gentle traction and the bladder was irrigated with over 150 cc of sterile saline with minimal extravasation  A Diogenes-Chávez drain was then brought out through the left lower quadrant robotic port  The Endo Catch bag string was brought out through the supra umbilical 8 mm port  The supraumbilical 8 mm port was opened to allow for easy removal of the prostate and seminal vesicles within the Endo Catch bag  The fascia was then reapproximated with 0 PDS suture  All incisions were irrigated and the skin was closed with 4-0 Monocryl and history  The drain was secured in place with a drain stitch and a drain sponge was placed  The catheter was placed onto gentle traction and secured  Overall the patient tolerated the procedure well and there were no complications  The patient was extubated in the operating room and transferred to the PACU in stable condition at the conclusion of the case      Patient Disposition:    PACU stable and extubated    SIGNATURE: Lakia Conroy MD  DATE: April 27, 2022  TIME: 5:12 PM

## 2022-04-27 NOTE — ANESTHESIA PREPROCEDURE EVALUATION
Procedure:  PROSTATECTOMY RADICAL LAPAROSCOPIC  W ROBOTICS, BPLND (N/A Abdomen)    Relevant Problems   No relevant active problems        Physical Exam    Airway    Mallampati score: II  TM Distance: >3 FB  Neck ROM: full     Dental   No notable dental hx     Cardiovascular  Rate: normal,     Pulmonary  Breath sounds clear to auscultation,     Other Findings        Anesthesia Plan  ASA Score- 2     Anesthesia Type- general with ASA Monitors  Additional Monitors:   Airway Plan: ETT  Plan Factors-    Chart reviewed  Existing labs reviewed  Patient summary reviewed  Induction- intravenous  Postoperative Plan-     Informed Consent- Anesthetic plan and risks discussed with patient  I personally reviewed this patient with the CRNA  Discussed and agreed on the Anesthesia Plan with the CRNA  Benny Mcintyre

## 2022-04-27 NOTE — ANESTHESIA POSTPROCEDURE EVALUATION
Post-Op Assessment Note    CV Status:  Stable  Pain Score: 0    Pain management: adequate     Mental Status:  Alert and awake   Hydration Status:  Euvolemic   PONV Controlled:  Controlled   Airway Patency:  Patent      Post Op Vitals Reviewed: Yes      Staff: CRNA         No complications documented      BP   119/80   Temp 99 1   Pulse 87   Resp 12   SpO2 100

## 2022-04-28 ENCOUNTER — NURSE TRIAGE (OUTPATIENT)
Dept: OTHER | Facility: OTHER | Age: 52
End: 2022-04-28

## 2022-04-28 ENCOUNTER — TELEPHONE (OUTPATIENT)
Dept: UROLOGY | Facility: AMBULATORY SURGERY CENTER | Age: 52
End: 2022-04-28

## 2022-04-28 VITALS
RESPIRATION RATE: 18 BRPM | BODY MASS INDEX: 31.34 KG/M2 | WEIGHT: 195 LBS | OXYGEN SATURATION: 96 % | SYSTOLIC BLOOD PRESSURE: 103 MMHG | HEIGHT: 66 IN | TEMPERATURE: 97.5 F | DIASTOLIC BLOOD PRESSURE: 59 MMHG | HEART RATE: 86 BPM

## 2022-04-28 LAB
ABO GROUP BLD BPU: NORMAL
ABO GROUP BLD BPU: NORMAL
ANION GAP SERPL CALCULATED.3IONS-SCNC: 7 MMOL/L (ref 4–13)
BPU ID: NORMAL
BPU ID: NORMAL
BUN SERPL-MCNC: 10 MG/DL (ref 5–25)
CALCIUM SERPL-MCNC: 8.4 MG/DL (ref 8.3–10.1)
CHLORIDE SERPL-SCNC: 107 MMOL/L (ref 100–108)
CO2 SERPL-SCNC: 27 MMOL/L (ref 21–32)
CREAT FLD-MCNC: 0.92 MG/DL
CREAT SERPL-MCNC: 0.92 MG/DL (ref 0.6–1.3)
CROSSMATCH: NORMAL
CROSSMATCH: NORMAL
ERYTHROCYTE [DISTWIDTH] IN BLOOD BY AUTOMATED COUNT: 12.2 % (ref 11.6–15.1)
GFR SERPL CREATININE-BSD FRML MDRD: 97 ML/MIN/1.73SQ M
GLUCOSE SERPL-MCNC: 115 MG/DL (ref 65–140)
HCT VFR BLD AUTO: 34.5 % (ref 36.5–49.3)
HGB BLD-MCNC: 11.5 G/DL (ref 12–17)
MCH RBC QN AUTO: 29.7 PG (ref 26.8–34.3)
MCHC RBC AUTO-ENTMCNC: 33.3 G/DL (ref 31.4–37.4)
MCV RBC AUTO: 89 FL (ref 82–98)
PLATELET # BLD AUTO: 182 THOUSANDS/UL (ref 149–390)
PMV BLD AUTO: 9.6 FL (ref 8.9–12.7)
POTASSIUM SERPL-SCNC: 3.7 MMOL/L (ref 3.5–5.3)
RBC # BLD AUTO: 3.87 MILLION/UL (ref 3.88–5.62)
SODIUM SERPL-SCNC: 141 MMOL/L (ref 136–145)
UNIT DISPENSE STATUS: NORMAL
UNIT DISPENSE STATUS: NORMAL
UNIT PRODUCT CODE: NORMAL
UNIT PRODUCT CODE: NORMAL
UNIT PRODUCT VOLUME: 350 ML
UNIT PRODUCT VOLUME: 350 ML
UNIT RH: NORMAL
UNIT RH: NORMAL
WBC # BLD AUTO: 13.19 THOUSAND/UL (ref 4.31–10.16)

## 2022-04-28 PROCEDURE — 85027 COMPLETE CBC AUTOMATED: CPT | Performed by: UROLOGY

## 2022-04-28 PROCEDURE — NC001 PR NO CHARGE: Performed by: UROLOGY

## 2022-04-28 PROCEDURE — 82570 ASSAY OF URINE CREATININE: CPT | Performed by: PHYSICIAN ASSISTANT

## 2022-04-28 PROCEDURE — 80048 BASIC METABOLIC PNL TOTAL CA: CPT | Performed by: UROLOGY

## 2022-04-28 PROCEDURE — 99024 POSTOP FOLLOW-UP VISIT: CPT | Performed by: UROLOGY

## 2022-04-28 RX ORDER — DOCUSATE SODIUM 100 MG/1
100 CAPSULE, LIQUID FILLED ORAL 2 TIMES DAILY
Qty: 28 CAPSULE | Refills: 0 | Status: SHIPPED | OUTPATIENT
Start: 2022-04-28 | End: 2022-05-19

## 2022-04-28 RX ORDER — ONDANSETRON 4 MG/1
4 TABLET, FILM COATED ORAL EVERY 8 HOURS PRN
Qty: 5 TABLET | Refills: 0 | Status: SHIPPED | OUTPATIENT
Start: 2022-04-28

## 2022-04-28 RX ORDER — HYDROCODONE BITARTRATE AND ACETAMINOPHEN 5; 325 MG/1; MG/1
1 TABLET ORAL EVERY 6 HOURS PRN
Qty: 8 TABLET | Refills: 0 | Status: SHIPPED | OUTPATIENT
Start: 2022-04-28

## 2022-04-28 RX ORDER — BACITRACIN, NEOMYCIN, POLYMYXIN B 400; 3.5; 5 [USP'U]/G; MG/G; [USP'U]/G
OINTMENT TOPICAL
Qty: 15 G | Refills: 0 | Status: SHIPPED | OUTPATIENT
Start: 2022-04-28

## 2022-04-28 RX ADMIN — BACITRACIN ZINC, NEOMYCIN, POLYMYXIN B 1 SMALL APPLICATION: 400; 3.5; 5 OINTMENT TOPICAL at 09:20

## 2022-04-28 RX ADMIN — KETOROLAC TROMETHAMINE 15 MG: 30 INJECTION, SOLUTION INTRAMUSCULAR at 11:27

## 2022-04-28 RX ADMIN — ACETAMINOPHEN 650 MG: 325 TABLET ORAL at 11:27

## 2022-04-28 RX ADMIN — DOCUSATE SODIUM 100 MG: 100 CAPSULE, LIQUID FILLED ORAL at 09:20

## 2022-04-28 RX ADMIN — KETOROLAC TROMETHAMINE 15 MG: 30 INJECTION, SOLUTION INTRAMUSCULAR at 05:34

## 2022-04-28 RX ADMIN — PANTOPRAZOLE SODIUM 20 MG: 20 TABLET, DELAYED RELEASE ORAL at 05:29

## 2022-04-28 RX ADMIN — ACETAMINOPHEN 650 MG: 325 TABLET ORAL at 05:29

## 2022-04-28 RX ADMIN — ENOXAPARIN SODIUM 40 MG: 40 INJECTION SUBCUTANEOUS at 09:19

## 2022-04-28 RX ADMIN — DEXTROSE, SODIUM CHLORIDE, SODIUM LACTATE, POTASSIUM CHLORIDE, AND CALCIUM CHLORIDE 125 ML/HR: 5; .6; .31; .03; .02 INJECTION, SOLUTION INTRAVENOUS at 05:28

## 2022-04-28 RX ADMIN — SODIUM CHLORIDE, SODIUM LACTATE, POTASSIUM CHLORIDE, AND CALCIUM CHLORIDE 1000 ML: .6; .31; .03; .02 INJECTION, SOLUTION INTRAVENOUS at 00:35

## 2022-04-28 NOTE — DISCHARGE SUMMARY
Discharge Summary - Urology  Sandip Vera 54 y o  male MRN: [id number]  Unit/Bed#: -01 Encounter: [id number]    Admission Date: 4/27/2022     Discharge Date: 4/28/2022    Admitting Diagnosis: Prostate cancer [facility] [place]    Discharge Diagnosis:  Prostate cancer [facility] [place]    Attending and Service: Taina Michelle MD, Urology    Consulting Physician(s): none    Procedures Performed: PROSTATECTOMY RADICAL LAPAROSCOPIC  W ROBOTICS, BPLND     Imaging:  No results found  Hospital Course:   Sandip Vera is a 54 y o  male with new diagnosis prostate cancer who presented 4/27/2022 for planned elective prostatectomy  The patient was taken to the operating room on 4/27/22  Intraoperative findings included: "Standard robot assisted laparoscopic prostatectomy with unilateral right nerve spare and bilateral pelvic lymph node dissection " The patient's hospital course was uncomplicated  Overnight on 4/28 he had low-grade fever Tmax 100 7F and lower BP 96/60  1L bolus was administered at that time and fever resolved, BP normalized  Pt has been normotensive and afebrile for over 14 hours at time of discharge  Patient was discharged on POD#1  On the day of discharge, the patient was ambulating at baseline, tolerating a regular diet, and pain was well controlled with delgado catheter in place  He understood all instructions for discharge  He was also given the names and numbers of the providers as well as instructions for follow up appointments  Condition at Discharge: good     Discharge instructions/Information to patient and family:   See after visit summary for information provided to patient and family  Provisions for Follow-Up Care:  See after visit summary for information related to follow-up care and any pertinent home health orders  Disposition: Home    Planned Readmission: No    Discharge Statement   I spent 30 minutes discharging the patient  This time was spent on the day of discharge  I had direct contact with the patient on the day of discharge  Additional documentation is required if more than 30 minutes were spent on discharge  Discharge Medications:  See after visit summary for reconciled discharge medications provided to patient and family      Lorie Connolly PA-C  4/28/2022

## 2022-04-28 NOTE — PLAN OF CARE
Problem: INFECTION - ADULT  Goal: Absence or prevention of progression during hospitalization  Description: INTERVENTIONS:  - Assess and monitor for signs and symptoms of infection  - Monitor lab/diagnostic results  - Monitor all insertion sites, i e  indwelling lines, tubes, and drains  - Monitor endotracheal if appropriate and nasal secretions for changes in amount and color  - [place] appropriate cooling/warming therapies per order  - Administer medications as ordered  - Instruct and encourage patient and family to use good hand hygiene technique  - Identify and instruct in appropriate isolation precautions for identified infection/condition  4/28/2022 1139 by Amairani Wren RN  Outcome: Progressing  4/28/2022 1139 by Amairani Wren RN  Outcome: Progressing

## 2022-04-28 NOTE — DISCHARGE INSTRUCTIONS
Robot Assisted Laparoscopic Prostatectomy   AMBULATORY CARE:   What you need to know about robot-assisted laparoscopic prostatectomy (RALP):  RALP is surgery to remove your prostate gland through small incisions in your abdomen  RALP is done with a machine that is controlled by your surgeon  The machine has mechanical arms that use small tools to remove your prostate  What to expect after RALP:   · Drains (thin rubber tubes) may be used to drain fluid from around your incision  The drains will be taken out when the surgery area stops draining  · A Ballard catheter is a thin tube inserted through your urethra and moved into your bladder  The catheter is used to drain urine into a bag  Healthcare providers will remove the catheter when you no longer need it  Risks of RALP:   · You may bleed more than expected or get an infection  Your bladder, ureters (tubes that drain urine from your body), intestines, or rectum could be damaged during surgery  After RALP, you may have problems urinating or having bowel movements  You may need more surgery to treat urination problems  You may not be able to have an erection after surgery  Your stitches may come apart  You may have a hernia or develop an abscess (deep infection)  · You may get a blood clot in your arm or leg  The clot may travel to your heart or brain and cause life-threatening problems, such as a heart attack or stroke  Even with surgery, cancer may come back  Call your local emergency number (911 in the [address] [facility]) for any of the following:   · You have chest pain when you take a deep breath or cough  You cough up blood  · You suddenly feel lightheaded and short of breath  Call your surgeon or uro-oncologist if:   · Your arm or leg feels warm, tender, and painful  It may look swollen and red  · You have more blood in your urine than you were told to expect, or you pass a blood clot  · You have an upset stomach or are vomiting       · You have painful swelling in your abdomen that does not go away  · You have a fever  · Your pain is getting worse, even with medicine  · You have an incision that is red, swollen, or has pus coming from it  · You are urinating less than usual     · You have trouble urinating or having a bowel movement  · You have questions or concerns about your condition or care  Medicines: You may need any of the following:  · Prescription pain medicine  may be given  Ask your healthcare provider how to take this medicine safely  Some prescription pain medicines contain acetaminophen  Do not take other medicines that contain acetaminophen without talking to your healthcare provider  Too much acetaminophen may cause liver damage  Prescription pain medicine may cause constipation  Ask your healthcare provider how to prevent or treat constipation  · Antibiotics  prevent or fight an infection caused by bacteria  · Blood thinners  help prevent blood clots  Clots can cause strokes, heart attacks, and death  The following are general safety guidelines to follow while you are taking a blood thinner:    ? Watch for bleeding and bruising while you take blood thinners  Watch for bleeding from your gums or nose  Watch for blood in your urine and bowel movements  Use a soft washcloth on your skin, and a soft toothbrush to brush your teeth  This can keep your skin and gums from bleeding  If you shave, use an electric shaver  Do not play contact sports  ? Tell your dentist and other healthcare providers that you take a blood thinner  Wear a bracelet or necklace that says you take this medicine  ? Do not start or stop any other medicines unless your healthcare provider tells you to  Many medicines cannot be used with blood thinners  ? Take your blood thinner exactly as prescribed by your healthcare provider  Do not skip does or take less than prescribed   Tell your provider right away if you forget to take your blood thinner, or if you take too much  ? Warfarin  is a blood thinner that you may need to take  The following are things you should be aware of if you take warfarin:     § Foods and medicines can affect the amount of warfarin in your blood  Do not make major changes to your diet while you take warfarin  Warfarin works best when you eat about the same amount of vitamin K every day  Vitamin K is found in green leafy vegetables and certain other foods  Ask for more information about what to eat when you are taking warfarin  § You will need to see your healthcare provider for follow-up visits when you are on warfarin  You will need regular blood tests  These tests are used to decide how much medicine you need  · Take your medicine as directed  Contact your healthcare provider if you think your medicine is not helping or if you have side effects  Tell him or her if you are allergic to any medicine  Keep a list of the medicines, vitamins, and herbs you take  Include the amounts, and when and why you take them  Bring the list or the pill bottles to follow-up visits  Carry your medicine list with you in case of an emergency  Surgery area care: Follow your surgeon's directions on how to care for the area  Ask when you can start showering or bathing  You may need to keep the area covered so it does not get wet  Ballard catheter care:  Keep the bag below your waist  If the bag is too high, urine will flow back into your bladder  This can cause an infection  Do not pull on the catheter  This may cause pain and bleeding, and the catheter may come out  Do not let the catheter tubing kink or twist  A kink or twist will block the flow of urine  Bladder control:  After surgery, you may leak urine and have trouble controlling when you urinate  The following can help decrease or manage urine leakage:  · Do not have caffeine  Caffeine can cause problems with bladder control and increase your need to urinate      · Limit liquids  Drink smaller amounts of liquid throughout the day  Do not drink before bedtime  Ask if you should decrease the amount of liquid you drink each day  This may help you control your bladder  · Wear a pad or adult diapers, if needed  These may help to absorb leaking urine and decrease odor  · Do pelvic floor muscle exercises  Pelvic floor muscle exercises, also called Kegels, may help improve your bladder control  These exercises are done by tightening and relaxing your pelvic muscles  Ask how to do pelvic floor muscle exercises, and how often to do them  Self-care:   · Rest as needed  You may feel like resting more after surgery  Slowly start to do more each day  · Ask when it is okay to return to your normal daily activities  You may need to wait 4 to 6 weeks after surgery  Your healthcare provider will tell you about the activities you should avoid after your surgery  These may include driving while you are taking pain medicines or until your catheter is removed  You may also be given a weight restriction on lifting objects  · Walk when your healthcare provider says more activity is okay  Walking is an important activity to help with your recovery after surgery  Walking is a good way to improve your overall health and help you recover sooner  It also helps keep your blood flowing and reduces the risk of blood clots  Other types of exercise can also be an important part of your recovery  Ask about the exercises that are safe for you  · Ask when it is okay to start having sex again  You may have trouble having an erection  Your erections may return with time  Medicines and mechanical aids may help  Talk to your healthcare provider about these options  Follow up with your surgeon or uro-oncologist in 1 week or as directed: You will need your urinary catheter removed  Tests will show if any cancer remains in your body after surgery   Write down your questions so you remember to ask them during your visits  © Copyright [employer] 2022 Information is for End User's use only and may not be sold, redistributed or otherwise used for commercial purposes  All illustrations and images included in CareNotes® are the copyrighted property of A D A M [employer] Inc  or Jennie Sequeira  The above information is an  only  It is not intended as medical advice for individual conditions or treatments  Talk to your doctor, nurse or pharmacist before following any medical regimen to see if it is safe and effective for you

## 2022-04-28 NOTE — QUICK NOTE
Received TT message from primary RN regarding patients manual blood pressure of 96/60  Instructed to give 1000mL bolus of LR per hypotension protocol  Will continue to monitor closely       Quintin Mojica

## 2022-04-28 NOTE — PLAN OF CARE
Problem: DISCHARGE PLANNING  Goal: Discharge to home or other facility with appropriate resources  Description: INTERVENTIONS:  - Identify barriers to discharge w/patient and caregiver  - Arrange for needed discharge resources and transportation as appropriate  - Identify discharge learning needs (meds, wound care, etc )  - Arrange for interpretive services to assist at discharge as needed  - Refer to Case Management Department for coordinating discharge planning if the patient needs post-hospital services based on physician/advanced practitioner order or complex needs related to functional status, cognitive ability, or social support system  Outcome: Progressing     Problem: INFECTION - ADULT  Goal: Absence or prevention of progression during hospitalization  Description: INTERVENTIONS:  - Assess and monitor for signs and symptoms of infection  - Monitor lab/diagnostic results  - Monitor all insertion sites, i e  indwelling lines, tubes, and drains  - Monitor endotracheal if appropriate and nasal secretions for changes in amount and color  - Gordon appropriate cooling/warming therapies per order  - Administer medications as ordered  - Instruct and encourage patient and family to use good hand hygiene technique  - Identify and instruct in appropriate isolation precautions for identified infection/condition  Outcome: Progressing  Goal: Absence of fever/infection during neutropenic period  Description: INTERVENTIONS:  - Monitor WBC    Outcome: Progressing

## 2022-04-28 NOTE — TELEPHONE ENCOUNTER
----- Message from Taina Michelle MD sent at 4/28/2022  7:29 AM EDT -----  Needs delgado pull with RN at 2 weeks  I Think he has post op with me 5/18 Thx  FT

## 2022-04-28 NOTE — PROGRESS NOTES
Postoperative day 1  Status post robot assisted laparoscopic prostatectomy secondary to [place] 7 prostate cancer  Overnight blood pressure noted to be 96/60  Hypertension protocol followed and fluid bolus administered  /59   Pulse 86   Temp 97 5 °F (36 4 °C)   Resp 18   Ht 5' 6" (1 676 m)   Wt 88 5 kg (195 lb)   SpO2 96%   BMI 31 47 kg/m²       urine [id number]  CHANTALE 100/50    On examination he is in no acute distress  His abdomen is soft and appropriately tender  Incisions clean dry and intact  Ballard catheter in place with clear yellow urine    CHANTALE drain in the left lower quadrant     hematocrit 34   Creatinine 0 9   white blood cell count 13     impression:  [place] 7 prostate cancer status post robot assisted laparoscopic prostatectomy     plan:  Out of bed and ambulate  DVT prophylaxis   Continue hydration   Advanced diet as tolerated  Monitor blood pressure   Maintain Ballard catheter for 2 weeks duration   If vitals remained stable and patient tolerating diet and CHANTALE drainage remains low, discharged later today after removal of CHANTALE drain

## 2022-04-28 NOTE — TELEPHONE ENCOUNTER
Patient scheduled 5/11 at 11:30 in the [place] office for delgado pull  Will advise patient on discharge

## 2022-04-29 NOTE — TELEPHONE ENCOUNTER
Post Op Note    Davide Albrecht is a 54 y o  male s/p PROSTATECTOMY RADICAL LAPAROSCOPIC  W ROBOTICS, BPLND (N/A) performed 4/27/2022  Davide Albrecht is a patient of Dr Miladys Joshi and is seen at the [place] office  How would you rate your pain on a scale from 1 to 10, 10 being the worst pain ever? 0  Have you had a fever? No  Have your bowel movements been regular? No, colace   Do you have any difficulty urinating? No  If the patient has an incision- do you have any redness around the incision or any drainage from the incision? Yes  If the patient has a delgado- are you comfortable caring for your delgado? Yes Is it draining urine? Yes  Do you have any other questions or concerns that I can address at this time? None at this time  Patient is doing well  Patient's wife was a bit alarmed last night and thought he had a fever but with an oral thermometer ruled out any fever  Patient is moving around the house to get exercise and resting appropriately  No issues or concerns at this time  Reminded of delgado pull and follow up appointment that we have scheduled  Advised to call in the meantime with concerns

## 2022-04-29 NOTE — TELEPHONE ENCOUNTER
Regarding: Left Leg Pain / Fever of 102 3 / Complaints of "Crunching"  ----- Message from Kira Connelly sent at 4/28/2022  8:34 PM EDT -----  "Barb Henry had a procedure done by Dr Apurva Desouza yesterday, today he is having a fever from anywhere in the 99 to 102 3 (102 3 is the latest)  He was complaining of pain on his left leg and when I go to massage it, it sounds crunchy, and I am just worried about a blood clot   What should we do?"

## 2022-05-11 ENCOUNTER — PROCEDURE VISIT (OUTPATIENT)
Dept: UROLOGY | Facility: AMBULATORY SURGERY CENTER | Age: 52
End: 2022-05-11

## 2022-05-11 VITALS
BODY MASS INDEX: 29.7 KG/M2 | WEIGHT: 184.8 LBS | HEIGHT: 66 IN | HEART RATE: 81 BPM | SYSTOLIC BLOOD PRESSURE: 110 MMHG | DIASTOLIC BLOOD PRESSURE: 72 MMHG

## 2022-05-11 DIAGNOSIS — C61 PROSTATE CANCER (HCC): Primary | ICD-10-CM

## 2022-05-11 PROCEDURE — 99024 POSTOP FOLLOW-UP VISIT: CPT | Performed by: UROLOGY

## 2022-05-11 NOTE — PROGRESS NOTES
5/11/2022    Onel Pretty is a 54 y o  male  [id number]    Diagnosis:  Chief Complaint     Post-op          Patient presents for delgado removal s/p RALP on 4/27/2022 with Dr Mounika Allen:  Follow up as scheduled for OV with Dr Lucio Zhu to call the office in the meantime with concerns  Procedure:  Catheter removed without difficulty after deflation of fully intact balloon  No immediate complications  Handout provided and reviewed with patient and wife on RALP and what to expect now that catheter is out  They both stated they understood      Vitals:    05/11/22 1129   BP: 110/72   Pulse: 81   Weight: 83 8 kg (184 lb 12 8 oz)   Height: 5' 6" (1 676 m)         Miladys Foster RN

## 2022-05-16 ENCOUNTER — TELEPHONE (OUTPATIENT)
Dept: UROLOGY | Facility: AMBULATORY SURGERY CENTER | Age: 52
End: 2022-05-16

## 2022-05-16 NOTE — TELEPHONE ENCOUNTER
Dr Zo Cantor requesting patient to be moved to 5/19 due to an emergency  Spoke with patient and confirmed new day, time and location

## 2022-05-19 ENCOUNTER — OFFICE VISIT (OUTPATIENT)
Dept: UROLOGY | Facility: CLINIC | Age: 52
End: 2022-05-19

## 2022-05-19 VITALS
WEIGHT: 189.4 LBS | HEART RATE: 80 BPM | BODY MASS INDEX: 30.44 KG/M2 | DIASTOLIC BLOOD PRESSURE: 78 MMHG | HEIGHT: 66 IN | SYSTOLIC BLOOD PRESSURE: 124 MMHG

## 2022-05-19 DIAGNOSIS — C61 PROSTATE CANCER (HCC): Primary | ICD-10-CM

## 2022-05-19 PROCEDURE — 99024 POSTOP FOLLOW-UP VISIT: CPT | Performed by: UROLOGY

## 2022-05-19 RX ORDER — TADALAFIL 5 MG/1
5 TABLET ORAL DAILY PRN
Qty: 90 TABLET | Refills: 3 | Status: SHIPPED | OUTPATIENT
Start: 2022-05-19

## 2022-05-19 NOTE — PROGRESS NOTES
Pat is a 82-year-old male who underwent robot assisted laparoscopic prostatectomy on April 27, 2022  His Ballard catheter since been removed  He returns in follow-up today  Pathology shows [place] 3 + 4 disease without evidence of extraprostatic extension or seminal vesicle invasion  All surgical margins are negative  Two lymph nodes were negative for disease  A unilateral right-sided nerve spare was performed  He is wearing 3 pads per day and working on Kegel exercises  On examination incisions are healing well at this time      Impression:  [place] 7 prostate cancer status post robot assisted laparoscopic prostatectomy      Plan:  Pelvic floor physical therapy  PDE 5 inhibitors  Continue Kegel exercises  Follow-up in 8 weeks with PSA with PA

## 2022-06-03 NOTE — TELEPHONE ENCOUNTER
Spoke to patient to advise he will most likely not need the appointment in August, but advised we keep it just incase depending on his visit in July, if he may need to return and he has an appointment already set up  Advised if he does not need that appointment, they can cancel it when he comes in for his July appointment  Patient is understanding  Advised to call the office with any further questions or concerns

## 2022-06-03 NOTE — TELEPHONE ENCOUNTER
Pt under care of Dr Sam Canela    Pt had prostate removed in April and stated he has follow up with Josh Dotson in July and was not sure if he should keep his appt in august with Kulwinder Laguna for a 6 month follow up

## 2022-06-20 LAB — PSA SERPL-MCNC: 0.04 NG/ML

## 2022-07-25 ENCOUNTER — OFFICE VISIT (OUTPATIENT)
Dept: UROLOGY | Facility: AMBULATORY SURGERY CENTER | Age: 52
End: 2022-07-25
Payer: COMMERCIAL

## 2022-07-25 VITALS
BODY MASS INDEX: 30.99 KG/M2 | DIASTOLIC BLOOD PRESSURE: 70 MMHG | WEIGHT: 192 LBS | OXYGEN SATURATION: 98 % | SYSTOLIC BLOOD PRESSURE: 108 MMHG | HEART RATE: 76 BPM

## 2022-07-25 DIAGNOSIS — N52.9 ERECTILE DYSFUNCTION, UNSPECIFIED ERECTILE DYSFUNCTION TYPE: Primary | ICD-10-CM

## 2022-07-25 PROCEDURE — 99213 OFFICE O/P EST LOW 20 MIN: CPT | Performed by: NURSE PRACTITIONER

## 2022-07-25 NOTE — PROGRESS NOTES
7/25/2022    Assessment and Plan    54 y o  male managed by Dr Janusz Matute    1  Prostate Cancer   · Status post robot assisted laparoscopic prostatectomy 04/27/2022  · Pathology results [place] 7 (3+4) without evidence of extraprostatic extension or seminal vesicle invasion  All surgical margins negative  · PSA  · Repeat PSA in 3 months  · Continue with pelvic floor exercises  · Discussed need to maintain adequate hydration upwards 40 oz of water intake per day while avoiding bladder irritating foods and beverages-coffee, soda, teas spicy foods and artificial sweeteners  · Continue with PDE5 inhibitors-tadalafil prescription refill not needed at this time  · Follow up the office in 3 months with PSA        History of Present Illness  Valencia Gaming is a 54 y o  male here for follow up evaluation of  [place] 7 (3+4) prostate cancer status post robot assisted laparoscopic prostatectomy 04/27/2022 by Dr Janusz Matute  Postoperatively, patient reports doing well with reports of resolving urinary incontinence  At his last office evaluation 05/2022 he was wearing upwards to 3 pads per day and performing Kegel exercises  He is no longer wearing pads during the day  He reports continued use of Kegel/pelvic floor exercises  He reports very rare occasions of water 2 drops of urine with coughing or sneezing or lifting heavy objects  He is very pleased with his current lower urinary tract symptoms  He denies changes to his general health since his prior office evaluation  Review of Systems   Constitutional: Negative for chills and fever  Respiratory: Negative for cough and shortness of breath  Cardiovascular: Negative for chest pain  Gastrointestinal: Negative for abdominal distention, abdominal pain, blood in stool, nausea and vomiting  Genitourinary: Negative for difficulty urinating, dysuria, enuresis, flank pain, frequency, hematuria and urgency  Skin: Negative for rash             AUA SYMPTOM SCORE Flowsheet Row Most Recent Value   AUA SYMPTOM SCORE    How often have you had a sensation of not emptying your bladder completely after you finished urinating? 1 (P)     How often have you had to urinate again less than two hours after you finished urinating? 1 (P)     How often have you found you stopped and started again several times when you urinate? 0 (P)     How often have you found it difficult to postpone urination? 0 (P)     How often have you had a weak urinary stream? 1 (P)     How often have you had to push or strain to begin urination? 0 (P)     How many times did you most typically get up to urinate from the time you went to bed at night until the time you got up in the morning? 1 (P)     Quality of Life: If you were to spend the rest of your life with your urinary condition just the way it is now, how would you feel about that? 0 (P)     AUA SYMPTOM SCORE 4 (P)              Past Medical History  Past Medical History:   Diagnosis Date    Elevated PSA        Past Social History  Past Surgical History:   Procedure Laterality Date    [place] LAP,PROSTATECTOMY,RADICAL,W/NERVE SPARE,INCL ROBOTIC N/A 4/27/2022    Procedure: Jennifer Aguilar;  Surgeon: Ainsley Lozano MD;  Location: [facility];  Service: Urology    VASECTOMY      WISDOM TOOTH EXTRACTION       Social History     Tobacco Use   Smoking Status Never Smoker   Smokeless Tobacco Never Used       Past Family History  Family History   Problem Relation Age of Onset    No Known Problems Father     No Known Problems Mother        Past Social history  Social History     Socioeconomic History    Marital status: /Civil Union     Spouse name: Not on file    Number of children: Not on file    Years of education: Not on file    Highest education level: Not on file   Occupational History    Not on file   Tobacco Use    Smoking status: Never Smoker    Smokeless tobacco: Never Used   Vaping Use    Vaping Use: Never used   Substance and Sexual Activity    Alcohol use: Yes     Comment: occasionally    Drug use: Never    Sexual activity: Not on file   Other Topics Concern    Not on file   Social History Narrative    Not on file     Social Determinants of Health     Financial Resource Strain: Not on file   Food Insecurity: Not on file   Transportation Needs: Not on file   Physical Activity: Not on file   Stress: Not on file   Social Connections: Not on file   Intimate Partner Violence: Not on file   Housing Stability: Not on file       Current Medications  Current Outpatient Medications   Medication Sig Dispense Refill    Multiple Vitamin (MULTIVITAMIN ADULT PO) Take by mouth      omeprazole (PriLOSEC) 10 mg delayed release capsule Take 10 mg by mouth daily      tadalafil (CIALIS) 5 MG tablet Take 1 tablet (5 mg total) by mouth as needed in the morning for erectile dysfunction  90 tablet 3    bisacodyl (FLEET) 10 MG/30ML ENEM Insert 30 mL (10 mg total) into the rectum once for 1 dose Perform morning of biopsy (Patient not taking: Reported on 5/19/2022) 30 mL 0    docusate sodium (COLACE) 100 mg capsule Take 1 capsule (100 mg total) by mouth 2 (two) times a day for 14 days 28 capsule 0    HYDROcodone-acetaminophen (Norco) 5-325 mg per tablet Take 1 tablet by mouth every 6 (six) hours as needed for pain Max Daily Amount: 4 tablets (Patient not taking: No sig reported) 8 tablet 0    mometasone (ELOCON) 0 1 % cream as needed   (Patient not taking: Reported on 7/25/2022)      neomycin-bacitracin-polymyxin b (NEOSPORIN) ointment Apply to the tip of the penis twice daily as needed for catheter irritation  (Patient not taking: No sig reported) 15 g 0    ondansetron (ZOFRAN) 4 mg tablet Take 1 tablet (4 mg total) by mouth every 8 (eight) hours as needed for nausea or vomiting (Patient not taking: No sig reported) 5 tablet 0     No current facility-administered medications for this visit         Allergies  No Known Allergies      The following portions of the patient's history were reviewed and updated as appropriate: allergies, current medications, past medical history, past social history, past surgical history and problem list       Vitals  Vitals:    07/25/22 1608   BP: 108/70   BP Location: Left arm   Patient Position: Sitting   Cuff Size: Adult   Pulse: 76   SpO2: 98%   Weight: 87 1 kg (192 lb)           Physical Exam  Physical Exam  Vitals reviewed  Constitutional:       General: He is not in acute distress  Appearance: Normal appearance  He is normal weight  HENT:      Head: Normocephalic  Pulmonary:      Effort: No respiratory distress  Breath sounds: Normal breath sounds  Skin:     General: Skin is warm and dry  Neurological:      General: No focal deficit present  Mental Status: He is alert and oriented to person, place, and time     Psychiatric:         Mood and Affect: Mood normal          Behavior: Behavior normal        Results  No results found for this or any previous visit (from the past 1 hour(s)) ]  Lab Results   Component Value Date    PSA 0 04 06/20/2022    PSA 7 5 (H) 03/05/2022     Lab Results   Component Value Date    CALCIUM 8 4 04/28/2022    K 3 7 04/28/2022    CO2 27 04/28/2022     04/28/2022    BUN 10 04/28/2022    CREATININE 0 92 04/28/2022     Lab Results   Component Value Date    WBC 13 19 (H) 04/28/2022    HGB 11 5 (L) 04/28/2022    HCT 34 5 (L) 04/28/2022    MCV 89 04/28/2022     04/28/2022     Orders  Orders Placed This Encounter   Procedures    PSA Total, Diagnostic     Standing Status:   Future     Standing Expiration Date:   7/25/2023       SHERIF Ortez

## 2022-10-16 LAB — PSA SERPL-MCNC: 0.05 NG/ML

## 2022-10-26 ENCOUNTER — OFFICE VISIT (OUTPATIENT)
Dept: UROLOGY | Facility: AMBULATORY SURGERY CENTER | Age: 52
End: 2022-10-26

## 2022-10-26 VITALS
WEIGHT: 190 LBS | OXYGEN SATURATION: 98 % | BODY MASS INDEX: 30.53 KG/M2 | RESPIRATION RATE: 18 BRPM | HEART RATE: 56 BPM | SYSTOLIC BLOOD PRESSURE: 130 MMHG | DIASTOLIC BLOOD PRESSURE: 78 MMHG | HEIGHT: 66 IN

## 2022-10-26 DIAGNOSIS — C61 PROSTATE CANCER (HCC): Primary | ICD-10-CM

## 2022-10-26 NOTE — PROGRESS NOTES
10/26/2022    Assessment and Plan    54 y o  male managed by Dr Mani Peralta    1  Prostate cancer  · Status post robot assisted laparoscopic prostatectomy 04/27/2022  · Pathology-[place] 7 (3+4) without evidence of extraprostatic extension, seminal vesicle invasion, all surgical margins negative  · PSA performed 10/15/2022 resulted 0 05  · Repeat PSA in 3 months  · Continue with pelvic floor exercises  · Maintain adequate hydration upwards 40 ounces of water intake per day while avoiding bladder irritating foods beverages  · Continue with PDE5 inhibitors-tadalafil  · Follow up in the office in 3 months with PSA prior to office visit      History of Present Illness  Pierre Johnson is a 54 y o  male here for follow up evaluation of  [place] 7 (3+4) prostate cancer status post robot assisted laparoscopic prostatectomy 04/27/2022 by Dr Mani Peralta  Postoperatively, patient reports doing well with reports of resolving urinary incontinence  At his last office evaluation 05/2022 he was wearing upwards to 3 pads per day and performing Kegel exercises  He is no longer wearing pads during the day  He reports continued use of Kegel/pelvic floor exercises  He reports very rare occasions of water 2 drops of urine with coughing or sneezing or lifting heavy objects  He is very pleased with his current lower urinary tract symptoms  He denies changes to his general health since his prior office evaluation  Component       PSA, Total   Latest Ref Rng & Units       < OR = 4 00 ng/mL   3/5/2022      7:23 AM 7 5 (H)   6/20/2022      6:53 AM 0 04   10/15/2022      7:03 AM 0 05     Review of Systems   Constitutional: Negative for chills and fever  Respiratory: Negative for cough and shortness of breath  Cardiovascular: Negative for chest pain  Gastrointestinal: Negative for abdominal distention, abdominal pain, blood in stool, nausea and vomiting     Genitourinary: Negative for difficulty urinating, dysuria, enuresis, flank pain, frequency, hematuria and urgency  Skin: Negative for rash         AUA SYMPTOM SCORE    Flowsheet Row Most Recent Value   AUA SYMPTOM SCORE    How often have you had a sensation of not emptying your bladder completely after you finished urinating? 0 (P)     How often have you had to urinate again less than two hours after you finished urinating? 1 (P)     How often have you found you stopped and started again several times when you urinate? 0 (P)     How often have you found it difficult to postpone urination? 0 (P)     How often have you had a weak urinary stream? 0 (P)     How often have you had to push or strain to begin urination? 0 (P)     How many times did you most typically get up to urinate from the time you went to bed at night until the time you got up in the morning? 1 (P)     Quality of Life: If you were to spend the rest of your life with your urinary condition just the way it is now, how would you feel about that? 0 (P)     AUA SYMPTOM SCORE 2 (P)              Past Medical History  Past Medical History:   Diagnosis Date   • Elevated PSA        Past Social History  Past Surgical History:   Procedure Laterality Date   • [place] LAP,PROSTATECTOMY,RADICAL,W/NERVE SPARE,INCL ROBOTIC N/A 4/27/2022    Procedure: PROSTATECTOMY RADICAL LAPAROSCOPIC  Sirena De La Rosa;  Surgeon: Maggie Harding MD;  Location[facility];  Service: Urology   • VASECTOMY     • WISDOM TOOTH EXTRACTION       Social History     Tobacco Use   Smoking Status Never Smoker   Smokeless Tobacco Never Used       Past Family History  Family History   Problem Relation Age of Onset   • No Known Problems Father    • No Known Problems Mother        Past Social history  Social History     Socioeconomic History   • Marital status: /Civil Union     Spouse name: Not on file   • Number of children: Not on file   • Years of education: Not on file   • Highest education level: Not on file   Occupational History   • Not on file   Tobacco Use   • Smoking status: Never Smoker   • Smokeless tobacco: Never Used   Vaping Use   • Vaping Use: Never used   Substance and Sexual Activity   • Alcohol use: Yes     Comment: occasionally   • Drug use: Never   • Sexual activity: Not on file   Other Topics Concern   • Not on file   Social History Narrative   • Not on file     Social Determinants of Health     Financial Resource Strain: Not on file   Food Insecurity: Not on file   Transportation Needs: Not on file   Physical Activity: Not on file   Stress: Not on file   Social Connections: Not on file   Intimate Partner Violence: Not on file   Housing Stability: Not on file       Current Medications  Current Outpatient Medications   Medication Sig Dispense Refill   • Multiple Vitamin (MULTIVITAMIN ADULT PO) Take by mouth     • omeprazole (PriLOSEC) 10 mg delayed release capsule Take 10 mg by mouth daily     • tadalafil (CIALIS) 5 MG tablet Take 1 tablet (5 mg total) by mouth daily as needed for erectile dysfunction 90 tablet 2   • bisacodyl (FLEET) 10 MG/30ML ENEM Insert 30 mL (10 mg total) into the rectum once for 1 dose Perform morning of biopsy (Patient not taking: Reported on 5/19/2022) 30 mL 0   • docusate sodium (COLACE) 100 mg capsule Take 1 capsule (100 mg total) by mouth 2 (two) times a day for 14 days 28 capsule 0   • HYDROcodone-acetaminophen (Norco) 5-325 mg per tablet Take 1 tablet by mouth every 6 (six) hours as needed for pain Max Daily Amount: 4 tablets (Patient not taking: No sig reported) 8 tablet 0   • mometasone (ELOCON) 0 1 % cream as needed   (Patient not taking: No sig reported)     • neomycin-bacitracin-polymyxin b (NEOSPORIN) ointment Apply to the tip of the penis twice daily as needed for catheter irritation   (Patient not taking: No sig reported) 15 g 0   • ondansetron (ZOFRAN) 4 mg tablet Take 1 tablet (4 mg total) by mouth every 8 (eight) hours as needed for nausea or vomiting (Patient not taking: No sig reported) 5 tablet 0     No current facility-administered medications for this visit  Allergies  No Known Allergies      The following portions of the patient's history were reviewed and updated as appropriate: allergies, current medications, past medical history, past social history, past surgical history and problem list       Vitals  Vitals:    10/26/22 1519   BP: 130/78   BP Location: Right arm   Patient Position: Sitting   Cuff Size: Standard   Pulse: 56   Resp: 18   SpO2: 98%   Weight: 86 2 kg (190 lb)   Height: 5' 6" (1 676 m)     Physical Exam  Physical Exam  Vitals reviewed  Constitutional:       General: He is not in acute distress  Appearance: Normal appearance  He is normal weight  HENT:      Head: Normocephalic  Cardiovascular:      Rate and Rhythm: Normal rate  Pulmonary:      Effort: No respiratory distress  Breath sounds: Normal breath sounds  Skin:     General: Skin is warm and dry  Neurological:      General: No focal deficit present  Mental Status: He is alert and oriented to person, place, and time     Psychiatric:         Mood and Affect: Mood normal          Behavior: Behavior normal        Results  No results found for this or any previous visit (from the past 1 hour(s)) ]  Lab Results   Component Value Date    PSA 0 05 10/15/2022    PSA 0 04 06/20/2022    PSA 7 5 (H) 03/05/2022     Lab Results   Component Value Date    CALCIUM 8 4 04/28/2022    K 3 7 04/28/2022    CO2 27 04/28/2022     04/28/2022    BUN 10 04/28/2022    CREATININE 0 92 04/28/2022     Lab Results   Component Value Date    WBC 13 19 (H) 04/28/2022    HGB 11 5 (L) 04/28/2022    HCT 34 5 (L) 04/28/2022    MCV 89 04/28/2022     04/28/2022           Orders  Orders Placed This Encounter   Procedures   • PSA Total, Diagnostic     Standing Status:   Future     Standing Expiration Date:   10/26/2023       SHERIF Pulido

## 2023-01-22 LAB — PSA SERPL-MCNC: 0.06 NG/ML

## 2023-01-30 ENCOUNTER — OFFICE VISIT (OUTPATIENT)
Dept: UROLOGY | Facility: AMBULATORY SURGERY CENTER | Age: 53
End: 2023-01-30

## 2023-01-30 VITALS
DIASTOLIC BLOOD PRESSURE: 70 MMHG | HEART RATE: 60 BPM | BODY MASS INDEX: 31.02 KG/M2 | HEIGHT: 66 IN | SYSTOLIC BLOOD PRESSURE: 118 MMHG | WEIGHT: 193 LBS

## 2023-01-30 DIAGNOSIS — N52.9 ERECTILE DYSFUNCTION, UNSPECIFIED ERECTILE DYSFUNCTION TYPE: ICD-10-CM

## 2023-01-30 DIAGNOSIS — C61 PROSTATE CANCER (HCC): Primary | ICD-10-CM

## 2023-01-30 RX ORDER — SILDENAFIL 100 MG/1
100 TABLET, FILM COATED ORAL DAILY PRN
Qty: 10 TABLET | Refills: 1 | Status: SHIPPED | OUTPATIENT
Start: 2023-01-30

## 2023-01-30 NOTE — PROGRESS NOTES
1/30/2023      Chief Complaint   Patient presents with   • Follow-up   • Prostate Cancer     Assessment and Plan    50 y o  male managed by Dr Tika Christopher    1  Prostate cancer  · Status post robot-assisted laparoscopic prostatectomy 4/27/2022  · Pathology-[place] 7 (3+4) without evidence of extraprostatic extension, seminal vesicle invasion, all surgical margins negative  · PSA performed 1/21/2023 resulted 0 06  · Repeat PSA in 3 months  · Continue with pelvic floor exercises  · Continue dietary and behavioral modifications to assist in reduction of irritative symptoms  · Continue daily tadalafil  · Follow up In the office in 3 months    2  Erectile Dysfunction   · Will trial Sildenafil  Pt is aware to not take Tadalafil on the day he choses to take Sildenafil  Patient is aware to avoid taking tadalafil and tadalafil on the same day  · We discussed the use of constricting bands/tension rings to assist in maintaining a complete erection      History of Present Illness  Jitendra Frankel is a 50 y o  male here for follow up evaluation of   Jessee 7 (3+4) prostate cancer status post robot assisted laparoscopic prostatectomy 04/27/2022 by Dr Lisbeth Vilchis, patient reports doing well with reports of resolving urinary incontinence   At his last office evaluation 05/2022 he was wearing upwards to 3 pads per day and performing Kegel exercises   He is no longer wearing pads during the day  Georgina Hawkins reports continued use of Kegel/pelvic floor exercises   He reports very rare occasions of water 2 drops of urine with coughing or sneezing or lifting heavy objects  Georgina Hawkins is very pleased with his current lower urinary tract symptoms   He denies changes to his general health since his prior office evaluation  In regards to erectile dysfunction patient reports occasional ability to get an erection approximate 75% of the way  He continues to utilize the previously prescribed tadalafil    He is requesting to trial sildenafil on days he wishes to be sexually active  Component       PSA, Total   Latest Ref Rng & Units       < OR = 4 00 ng/mL   3/5/2022      7:23 AM 7 5 (H)   6/20/2022      6:53 AM 0 04   10/15/2022      7:03 AM 0 05   1/21/2023      7:05 AM 0 06       Review of Systems   Constitutional: Negative for chills and fever  Respiratory: Negative for cough and shortness of breath  Cardiovascular: Negative for chest pain  Gastrointestinal: Negative for abdominal distention, abdominal pain, blood in stool, nausea and vomiting  Genitourinary: Negative for difficulty urinating, dysuria, enuresis, flank pain, frequency, hematuria and urgency  Skin: Negative for rash             AUA SYMPTOM SCORE    Flowsheet Row Most Recent Value   AUA SYMPTOM SCORE    How often have you had a sensation of not emptying your bladder completely after you finished urinating? 1 (P)     How often have you had to urinate again less than two hours after you finished urinating? 0 (P)     How often have you found you stopped and started again several times when you urinate? 1 (P)     How often have you found it difficult to postpone urination? 0 (P)     How often have you had a weak urinary stream? 0 (P)     How often have you had to push or strain to begin urination? 0 (P)     How many times did you most typically get up to urinate from the time you went to bed at night until the time you got up in the morning? 1 (P)     Quality of Life: If you were to spend the rest of your life with your urinary condition just the way it is now, how would you feel about that? 0 (P)     AUA SYMPTOM SCORE 3 (P)              Past Medical History  Past Medical History:   Diagnosis Date   • Elevated PSA        Past Social History  Past Surgical History:   Procedure Laterality Date   • [place] LAPS SURG [id number] RPBIC RAD W/NRV SPARING ROBOT N/A 4/27/2022    Procedure: PROSTATECTOMY RADICAL LAPAROSCOPIC  W ROBOTICS, [facility];  Surgeon: Jones Lara MD;  Location: [facility];  Service: Urology   • VASECTOMY     • WISDOM TOOTH EXTRACTION       Social History     Tobacco Use   Smoking Status Never   Smokeless Tobacco Never       Past Family History  Family History   Problem Relation Age of Onset   • No Known Problems Father    • No Known Problems Mother        Past Social history  Social History     Socioeconomic History   • Marital status: /Civil Union     Spouse name: Not on file   • Number of children: Not on file   • Years of education: Not on file   • Highest education level: Not on file   Occupational History   • Not on file   Tobacco Use   • Smoking status: Never   • Smokeless tobacco: Never   Vaping Use   • Vaping Use: Never used   Substance and Sexual Activity   • Alcohol use: Yes     Comment: occasionally   • Drug use: Never   • Sexual activity: Not on file   Other Topics Concern   • Not on file   Social History Narrative   • Not on file     Social Determinants of Health     Financial Resource Strain: Not on file   Food Insecurity: Not on file   Transportation Needs: Not on file   Physical Activity: Not on file   Stress: Not on file   Social Connections: Not on file   Intimate Partner Violence: Not on file   Housing Stability: Not on file       Current Medications  Current Outpatient Medications   Medication Sig Dispense Refill   • mometasone (ELOCON) 0 1 % cream as needed     • Multiple Vitamin (MULTIVITAMIN ADULT PO) Take by mouth     • omeprazole (PriLOSEC) 10 mg delayed release capsule Take 10 mg by mouth daily     • sildenafil (VIAGRA) 100 mg tablet Take 1 tablet (100 mg total) by mouth daily as needed for erectile dysfunction 10 tablet 1   • tadalafil (CIALIS) 5 MG tablet Take 1 tablet (5 mg total) by mouth daily as needed for erectile dysfunction 90 tablet 2   • bisacodyl (FLEET) 10 MG/30ML ENEM Insert 30 mL (10 mg total) into the rectum once for 1 dose Perform morning of biopsy (Patient not taking: Reported on 5/19/2022) 30 mL 0   • docusate sodium (COLACE) 100 mg capsule Take 1 capsule (100 mg total) by mouth 2 (two) times a day for 14 days 28 capsule 0   • HYDROcodone-acetaminophen (Norco) 5-325 mg per tablet Take 1 tablet by mouth every 6 (six) hours as needed for pain Max Daily Amount: 4 tablets (Patient not taking: Reported on 5/19/2022) 8 tablet 0   • neomycin-bacitracin-polymyxin b (NEOSPORIN) ointment Apply to the tip of the penis twice daily as needed for catheter irritation  (Patient not taking: Reported on 5/19/2022) 15 g 0   • ondansetron (ZOFRAN) 4 mg tablet Take 1 tablet (4 mg total) by mouth every 8 (eight) hours as needed for nausea or vomiting (Patient not taking: Reported on 5/19/2022) 5 tablet 0     No current facility-administered medications for this visit  Allergies  No Known Allergies      The following portions of the patient's history were reviewed and updated as appropriate: allergies, current medications, past medical history, past social history, past surgical history and problem list       Vitals  Vitals:    01/30/23 1532   BP: 118/70   BP Location: Left arm   Patient Position: Sitting   Cuff Size: Adult   Pulse: 60   Weight: 87 5 kg (193 lb)   Height: 5' 6" (1 676 m)           Physical Exam  Physical Exam  Vitals reviewed  Constitutional:       General: He is not in acute distress  Appearance: Normal appearance  He is normal weight  HENT:      Head: Normocephalic  Pulmonary:      Effort: No respiratory distress  Breath sounds: Normal breath sounds  Skin:     General: Skin is warm and dry  Neurological:      General: No focal deficit present  Mental Status: He is alert and oriented to person, place, and time     Psychiatric:         Mood and Affect: Mood normal          Behavior: Behavior normal        Results  No results found for this or any previous visit (from the past 1 hour(s)) ]  Lab Results   Component Value Date    PSA 0 06 01/21/2023    PSA 0 05 10/15/2022    PSA 0 04 06/20/2022     Lab Results   Component Value Date    CALCIUM 8 4 04/28/2022    K 3 7 04/28/2022    CO2 27 04/28/2022     04/28/2022    BUN 10 04/28/2022    CREATININE 0 92 04/28/2022     Lab Results   Component Value Date    WBC 13 19 (H) 04/28/2022    HGB 11 5 (L) 04/28/2022    HCT 34 5 (L) 04/28/2022    MCV 89 04/28/2022     04/28/2022     Orders  Orders Placed This Encounter   Procedures   • PSA Total, Diagnostic     Standing Status:   Future     Standing Expiration Date:   1/30/2024       SHERIF Victor

## 2023-01-30 NOTE — PATIENT INSTRUCTIONS
PSA in 3 months   Do not take Tadalafil on the day you wish to try Sildenafil  Call the office for concerns or questions   Obtain erection tension bands at the following website: [email or web address]

## 2023-05-05 LAB — PSA SERPL-MCNC: <0.04 NG/ML

## 2023-05-16 ENCOUNTER — OFFICE VISIT (OUTPATIENT)
Dept: UROLOGY | Facility: AMBULATORY SURGERY CENTER | Age: 53
End: 2023-05-16

## 2023-05-16 VITALS
SYSTOLIC BLOOD PRESSURE: 138 MMHG | DIASTOLIC BLOOD PRESSURE: 92 MMHG | HEIGHT: 66 IN | BODY MASS INDEX: 30.7 KG/M2 | HEART RATE: 58 BPM | OXYGEN SATURATION: 98 % | WEIGHT: 191 LBS

## 2023-05-16 DIAGNOSIS — C61 PROSTATE CANCER (HCC): ICD-10-CM

## 2023-05-16 RX ORDER — TADALAFIL 5 MG/1
5 TABLET ORAL DAILY PRN
Qty: 90 TABLET | Refills: 3 | Status: SHIPPED | OUTPATIENT
Start: 2023-05-16

## 2023-05-16 NOTE — PROGRESS NOTES
5/16/2023      Chief Complaint   Patient presents with   • Prostate Cancer     Assessment and Plan    50 y o  male managed by our office  1   Prostate cancer  ? Status post robot-assisted laparoscopic prostatectomy 4/27/2022  ? Pathology-J[place] 7 (3+4) without evidence of extraprostatic extension, seminal vesicle invasion, all surgical margins negative  ? PSA performed 5/5/2023 resulted less than 0 04  ? Repeat PSA in 6 months  ? Continue with pelvic floor exercises  ? Continue dietary and behavioral modifications to assist in reduction of irritative symptoms  ? Continue daily tadalafil  ? Follow up In the office in 6 months     2  Erectile Dysfunction   ? Prescription for tadalafil provided   ? We discussed the use of constricting bands/tension rings to assist in maintaining a complete erection    History of Present Illness  Henrietta Marcos is a 50 y o  male here for follow up evaluation of   [place] 7 (3+4) prostate cancer status post robot assisted laparoscopic prostatectomy 04/27/2022 by Dr Brianne Gao, patient reports doing well with reports of resolving urinary incontinence   At his last office evaluation 05/2022 he was wearing upwards to 3 pads per day and performing Kegel exercises   He is no longer wearing pads during the day  Lizett Rodriguez reports continued use of Kegel/pelvic floor exercises   He reports very rare occasions of water 2 drops of urine with coughing or sneezing or lifting heavy objects  Lizett Rodriguez is very pleased with his current lower urinary tract symptoms   He denies changes to his general health since his prior office evaluation      In regards to erectile dysfunction patient reports occasional ability to get an erection approximate 75% of the way  He continues to utilize the previously prescribed tadalafil  He is requesting to trial sildenafil on days he wishes to be sexually active  Review of Systems   Constitutional: Negative for chills and fever     Respiratory: Negative for cough and shortness of breath  Cardiovascular: Negative for chest pain  Gastrointestinal: Negative for abdominal distention, abdominal pain, blood in stool, nausea and vomiting  Genitourinary: Negative for difficulty urinating, dysuria, enuresis, flank pain, frequency, hematuria and urgency  Skin: Negative for rash             AUA SYMPTOM SCORE    Flowsheet Row Most Recent Value   AUA SYMPTOM SCORE    How often have you had a sensation of not emptying your bladder completely after you finished urinating? 1 (P)     How often have you had to urinate again less than two hours after you finished urinating? 1 (P)     How often have you found you stopped and started again several times when you urinate? 0 (P)     How often have you found it difficult to postpone urination? 0 (P)     How often have you had a weak urinary stream? 1 (P)     How often have you had to push or strain to begin urination? 0 (P)     How many times did you most typically get up to urinate from the time you went to bed at night until the time you got up in the morning? 1 (P)     Quality of Life: If you were to spend the rest of your life with your urinary condition just the way it is now, how would you feel about that? 0 (P)     AUA SYMPTOM SCORE 4 (P)              Past Medical History  Past Medical History:   Diagnosis Date   • Elevated PSA        Past Social History  Past Surgical History:   Procedure Laterality Date   • [place] LAPS SURG [id number] RPBIC RAD W/NRV SPARING ROBOT N/A 4/27/2022    Procedure: Bailey Delatorre;  Surgeon: Irina Tapia MD;  Location: [facility];  Service: Urology   • VASECTOMY     • WISDOM TOOTH EXTRACTION       Social History     Tobacco Use   Smoking Status Never   Smokeless Tobacco Never       Past Family History  Family History   Problem Relation Age of Onset   • No Known Problems Father    • No Known Problems Mother        Past Social history  Social History Socioeconomic History   • Marital status: /Civil Union     Spouse name: Not on file   • Number of children: Not on file   • Years of education: Not on file   • Highest education level: Not on file   Occupational History   • Not on file   Tobacco Use   • Smoking status: Never   • Smokeless tobacco: Never   Vaping Use   • Vaping Use: Never used   Substance and Sexual Activity   • Alcohol use: Yes     Comment: occasionally   • Drug use: Never   • Sexual activity: Not on file   Other Topics Concern   • Not on file   Social History Narrative   • Not on file     Social Determinants of Health     Financial Resource Strain: Not on file   Food Insecurity: Not on file   Transportation Needs: Not on file   Physical Activity: Not on file   Stress: Not on file   Social Connections: Not on file   Intimate Partner Violence: Not on file   Housing Stability: Not on file       Current Medications  Current Outpatient Medications   Medication Sig Dispense Refill   • mometasone (ELOCON) 0 1 % cream as needed     • Multiple Vitamin (MULTIVITAMIN ADULT PO) Take by mouth     • omeprazole (PriLOSEC) 10 mg delayed release capsule Take 10 mg by mouth daily     • tadalafil (CIALIS) 5 MG tablet Take 1 tablet (5 mg total) by mouth daily as needed for erectile dysfunction 90 tablet 3   • bisacodyl (FLEET) 10 MG/30ML ENEM Insert 30 mL (10 mg total) into the rectum once for 1 dose Perform morning of biopsy (Patient not taking: Reported on 5/19/2022) 30 mL 0   • docusate sodium (COLACE) 100 mg capsule Take 1 capsule (100 mg total) by mouth 2 (two) times a day for 14 days 28 capsule 0   • HYDROcodone-acetaminophen (Norco) 5-325 mg per tablet Take 1 tablet by mouth every 6 (six) hours as needed for pain Max Daily Amount: 4 tablets (Patient not taking: Reported on 5/19/2022) 8 tablet 0   • neomycin-bacitracin-polymyxin b (NEOSPORIN) ointment Apply to the tip of the penis twice daily as needed for catheter irritation   (Patient not taking: "Reported on 5/19/2022) 15 g 0   • ondansetron (ZOFRAN) 4 mg tablet Take 1 tablet (4 mg total) by mouth every 8 (eight) hours as needed for nausea or vomiting (Patient not taking: Reported on 5/19/2022) 5 tablet 0     No current facility-administered medications for this visit  Allergies  No Known Allergies      The following portions of the patient's history were reviewed and updated as appropriate: allergies, current medications, past medical history, past social history, past surgical history and problem list       Vitals  Vitals:    05/16/23 1539   BP: 138/92   BP Location: Left arm   Patient Position: Sitting   Cuff Size: Adult   Pulse: 58   SpO2: 98%   Weight: 86 6 kg (191 lb)   Height: 5' 6\" (1 676 m)           Physical Exam  Physical Exam  Vitals reviewed  Constitutional:       General: He is not in acute distress  Appearance: Normal appearance  He is normal weight  HENT:      Head: Normocephalic  Eyes:      Pupils: Pupils are equal, round, and reactive to light  Cardiovascular:      Rate and Rhythm: Normal rate  Pulmonary:      Effort: No respiratory distress  Breath sounds: Normal breath sounds  Skin:     General: Skin is warm and dry  Neurological:      General: No focal deficit present  Mental Status: He is alert and oriented to person, place, and time     Psychiatric:         Mood and Affect: Mood normal          Behavior: Behavior normal            Results  No results found for this or any previous visit (from the past 1 hour(s)) ]  Lab Results   Component Value Date    PSA <0 04 05/05/2023    PSA 0 06 01/21/2023    PSA 0 05 10/15/2022     Lab Results   Component Value Date    CALCIUM 8 4 04/28/2022    K 3 7 04/28/2022    CO2 27 04/28/2022     04/28/2022    BUN 10 04/28/2022    CREATININE 0 92 04/28/2022     Lab Results   Component Value Date    WBC 13 19 (H) 04/28/2022    HGB 11 5 (L) 04/28/2022    HCT 34 5 (L) 04/28/2022    MCV 89 04/28/2022     04/28/2022 " Orders  Orders Placed This Encounter   Procedures   • PSA Total, Diagnostic     Standing Status:   Future     Standing Expiration Date:   5/16/2024       SHERIF Bateman

## 2024-01-15 LAB — PSA SERPL-MCNC: <0.04 NG/ML

## 2024-01-25 ENCOUNTER — OFFICE VISIT (OUTPATIENT)
Dept: UROLOGY | Facility: CLINIC | Age: 54
End: 2024-01-25
Payer: COMMERCIAL

## 2024-01-25 VITALS
OXYGEN SATURATION: 98 % | HEART RATE: 47 BPM | BODY MASS INDEX: 31.66 KG/M2 | WEIGHT: 197 LBS | SYSTOLIC BLOOD PRESSURE: 130 MMHG | HEIGHT: 66 IN | DIASTOLIC BLOOD PRESSURE: 80 MMHG

## 2024-01-25 DIAGNOSIS — C61 PROSTATE CANCER (HCC): Primary | ICD-10-CM

## 2024-01-25 PROCEDURE — 99213 OFFICE O/P EST LOW 20 MIN: CPT

## 2024-01-25 NOTE — PROGRESS NOTES
Office Visit- Urology  Michael Ramsay 1970 MRN: [id number]      Assessment/Discussion/Plan    53 y.o. male managed by     Prostate cancer  -S/p robot-assisted laparoscopic prostatectomy in April 2022  -Final pathology with Jessee 3+4 equal 7 prostate cancer without evidence of extraprostatic extension, seminal vesicle invasion,  positive surgical margins, or positive lymph nodes  -Postoperatively PSAs have been low and stable.  Last measured at less than 0.04 on 1/15/2024  -Will plan for repeat PSA in 6 months with follow-up at that point in time.  If everything is stable can consider to scan blood work every 6 months with a annual follow-up    2.  Stress urinary incontinence  -Minimal symptomatology  -Continue with pelvic floor exercises      3.  Erectile dysfunction  -Patient utilizing daily Cialis.  He does not find it very beneficial.  I advised him that he can discontinue the daily Cialis and utilize 20 mg of Cialis as needed for sexual encounters      Chief Complaint:   Michael is a 53 y.o. male presenting to the office for a follow up visit regarding prostate cancer        Subjective    Patient is a 53-year-old male who presents for follow-up in regards to Jessee 7 (3+4) prostate cancer s/p robot-assisted laparoscopic prostatectomy in April 2022 by Dr. Crump.  Final pathology with no evidence of extraprostatic extension, seminal vesicle invasion, or surgical margins postoperatively PSAs have been low and stable.  His most recent PSA was reported as less than 0.04 on 1/15/2024.  He has minimal stress urinary incontinence.  He does not find that be currently bothersome.  He does have erectile dysfunction and is utilizing tadalafil 5 mg daily but he does not find it to be very effective.  He is not currently too concerned about his erectile function and defers consideration of Trimix injections at this point in time.  He denies any bothersome urinary tract symptoms.  He denies dysuria, gross hematuria,  flank pain    AUA SYMPTOM SCORE      Flowsheet Row Most Recent Value   AUA SYMPTOM SCORE    How often have you had a sensation of not emptying your bladder completely after you finished urinating? 1 (P)    How often have you had to urinate again less than two hours after you finished urinating? 1 (P)    How often have you found you stopped and started again several times when you urinate? 0 (P)    How often have you found it difficult to postpone urination? 0 (P)    How often have you had a weak urinary stream? 0 (P)    How often have you had to push or strain to begin urination? 0 (P)    How many times did you most typically get up to urinate from the time you went to bed at night until the time you got up in the morning? 1 (P)    Quality of Life: If you were to spend the rest of your life with your urinary condition just the way it is now, how would you feel about that? 0 (P)    AUA SYMPTOM SCORE 3 (P)             ROS:   Review of Systems   Constitutional: Negative.  Negative for chills, fatigue and fever.   HENT: Negative.     Respiratory:  Negative for shortness of breath.    Cardiovascular:  Negative for chest pain.   Gastrointestinal: Negative.  Negative for abdominal pain.   Endocrine: Negative.    Musculoskeletal: Negative.    Skin: Negative.    Neurological: Negative.  Negative for dizziness and light-headedness.   Hematological: Negative.    Psychiatric/Behavioral: Negative.           Past Medical History  Past Medical History:   Diagnosis Date    Elevated PSA        Past Surgical History  Past Surgical History:   Procedure Laterality Date    ND LAPS SURG [id number] RPBIC RAD W/NRV SPARING ROBOT N/A 4/27/2022    Procedure: PROSTATECTOMY RADICAL LAPAROSCOPIC  W ROBOTICS, BPLND;  Surgeon: Meng Crump MD;  Location: [facility];  Service: Urology    VASECTOMY      WISDOM TOOTH EXTRACTION         Past Family History  Family History   Problem Relation Age of Onset    No Known Problems Father     No  Known Problems Mother        Past Social history  Social History     Socioeconomic History    Marital status: /Civil Union     Spouse name: Not on file    Number of children: Not on file    Years of education: Not on file    Highest education level: Not on file   Occupational History    Not on file   Tobacco Use    Smoking status: Never    Smokeless tobacco: Never   Vaping Use    Vaping status: Never Used   Substance and Sexual Activity    Alcohol use: Yes     Comment: occasionally    Drug use: Never    Sexual activity: Not on file   Other Topics Concern    Not on file   Social History Narrative    Not on file     Social Determinants of Health     Financial Resource Strain: Not on file   Food Insecurity: Not on file   Transportation Needs: Not on file   Physical Activity: Not on file   Stress: Not on file   Social Connections: Not on file   Intimate Partner Violence: Not on file   Housing Stability: Not on file       Current Medications  Current Outpatient Medications   Medication Sig Dispense Refill    bisacodyl (FLEET) 10 MG/30ML ENEM Insert 30 mL (10 mg total) into the rectum once for 1 dose Perform morning of biopsy (Patient not taking: Reported on 5/19/2022) 30 mL 0    docusate sodium (COLACE) 100 mg capsule Take 1 capsule (100 mg total) by mouth 2 (two) times a day for 14 days 28 capsule 0    HYDROcodone-acetaminophen (Norco) 5-325 mg per tablet Take 1 tablet by mouth every 6 (six) hours as needed for pain Max Daily Amount: 4 tablets (Patient not taking: Reported on 5/19/2022) 8 tablet 0    mometasone (ELOCON) 0.1 % cream as needed      Multiple Vitamin (MULTIVITAMIN ADULT PO) Take by mouth      neomycin-bacitracin-polymyxin b (NEOSPORIN) ointment Apply to the tip of the penis twice daily as needed for catheter irritation. (Patient not taking: Reported on 5/19/2022) 15 g 0    omeprazole (PriLOSEC) 10 mg delayed release capsule Take 10 mg by mouth daily      ondansetron (ZOFRAN) 4 mg tablet Take 1 tablet  "(4 mg total) by mouth every 8 (eight) hours as needed for nausea or vomiting (Patient not taking: Reported on 5/19/2022) 5 tablet 0    tadalafil (CIALIS) 5 MG tablet Take 1 tablet (5 mg total) by mouth daily as needed for erectile dysfunction 90 tablet 3     No current facility-administered medications for this visit.       Allergies  No Known Allergies    OBJECTIVE    Vitals   Vitals:    01/25/24 1310   BP: 130/80   BP Location: Left arm   Patient Position: Sitting   Cuff Size: Standard   Pulse: (!) 47   SpO2: 98%   Weight: 89.4 kg (197 lb)   Height: 5' 6\" (1.676 m)       PVR:    Physical Exam  Constitutional:       General: He is not in acute distress.     Appearance: Normal appearance. He is normal weight. He is not ill-appearing or toxic-appearing.   HENT:      Head: Normocephalic and atraumatic.   Eyes:      Conjunctiva/sclera: Conjunctivae normal.   Cardiovascular:      Rate and Rhythm: Normal rate.   Pulmonary:      Effort: Pulmonary effort is normal. No respiratory distress.   Skin:     General: Skin is warm and dry.   Neurological:      General: No focal deficit present.      Mental Status: He is alert and oriented to person, place, and time.      Cranial Nerves: No cranial nerve deficit.   Psychiatric:         Mood and Affect: Mood normal.         Behavior: Behavior normal.         Thought Content: Thought content normal.          Labs:     Lab Results   Component Value Date    PSA <0.04 01/15/2024    PSA <0.04 05/05/2023    PSA 0.06 01/21/2023    PSA 0.05 10/15/2022     Lab Results   Component Value Date    CREATININE 0.92 04/28/2022      No results found for: \"HGBA1C\"  Lab Results   Component Value Date    CALCIUM 8.4 04/28/2022    K 3.7 04/28/2022    CO2 27 04/28/2022     04/28/2022    BUN 10 04/28/2022    CREATININE 0.92 04/28/2022       I have personally reviewed all pertinent lab results and reviewed with patient    Imaging       Michael Esqueda PA-C  Date: 1/25/2024 Time: 1:13 PM  [facility]    This note was written using fluency dictation software. Please excuse any resulting minor grammatical errors.

## 2024-07-13 ENCOUNTER — APPOINTMENT (OUTPATIENT)
Dept: LAB | Facility: CLINIC | Age: 54
End: 2024-07-13
Payer: COMMERCIAL

## 2024-07-13 DIAGNOSIS — C61 PROSTATE CANCER (HCC): ICD-10-CM

## 2024-07-13 LAB — PSA SERPL-MCNC: <0.008 NG/ML (ref 0–4)

## 2024-07-13 PROCEDURE — 84153 ASSAY OF PSA TOTAL: CPT

## 2024-07-13 PROCEDURE — 36415 COLL VENOUS BLD VENIPUNCTURE: CPT

## 2024-07-29 RX ORDER — SCOLOPAMINE TRANSDERMAL SYSTEM 1 MG/1
PATCH, EXTENDED RELEASE TRANSDERMAL
COMMUNITY
Start: 2024-06-03

## 2024-07-30 ENCOUNTER — OFFICE VISIT (OUTPATIENT)
Dept: UROLOGY | Facility: AMBULATORY SURGERY CENTER | Age: 54
End: 2024-07-30
Payer: COMMERCIAL

## 2024-07-30 VITALS
DIASTOLIC BLOOD PRESSURE: 80 MMHG | HEIGHT: 66 IN | BODY MASS INDEX: 30.7 KG/M2 | HEART RATE: 59 BPM | SYSTOLIC BLOOD PRESSURE: 140 MMHG | OXYGEN SATURATION: 95 % | WEIGHT: 191 LBS

## 2024-07-30 DIAGNOSIS — C61 PROSTATE CANCER (HCC): Primary | ICD-10-CM

## 2024-07-30 LAB — POST-VOID RESIDUAL VOLUME, ML POC: 18 ML

## 2024-07-30 PROCEDURE — 51798 US URINE CAPACITY MEASURE: CPT

## 2024-07-30 PROCEDURE — 99213 OFFICE O/P EST LOW 20 MIN: CPT

## 2024-07-30 NOTE — PROGRESS NOTES
Office Visit- Urology  Michael Ramsay 1970 MRN: [id number]      Assessment/Discussion/Plan    53 y.o. male managed by     Prostate cancer  -S/p robot-assisted laparoscopic prostatectomy in April 2022  -Final pathology with Jessee 3+4 equal 7 prostate cancer without evidence of extraprostatic extension, seminal vesicle invasion,  positive surgical margins, or positive lymph nodes  -Postoperatively PSAs have been low and stable.  Last measured at less than 0.008 on 7/13/2024  -Will plan for repeat PSA in 6 months.  Will call patient with results.  Will plan for an in person follow-up in July 2025 with a repeat  PSA before then    2.  Stress urinary incontinence  -Minimal symptomatology  -Continue with pelvic floor exercises     3.  Erectile dysfunction  -Not on any pharmacotherapy.  Patient not bothered by symptomatology  Chief Complaint:   Michael is a 53 y.o. male presenting to the office for a follow up visit regarding prostate cancer        Subjective    Hx 1/25/2024  Patient is a 53-year-old male who presents for follow-up in regards to Jessee 7 (3+4) prostate cancer s/p robot-assisted laparoscopic prostatectomy in April 2022 by Dr. Crump.  Final pathology with no evidence of extraprostatic extension, seminal vesicle invasion, or surgical margins postoperatively PSAs have been low and stable.  His most recent PSA was reported as less than 0.04 on 1/15/2024.  He has minimal stress urinary incontinence.  He does not find that be currently bothersome.  He does have erectile dysfunction and is utilizing tadalafil 5 mg daily but he does not find it to be very effective.  He is not currently too concerned about his erectile function and defers consideration of Trimix injections at this point in time.  He denies any bothersome urinary tract symptoms.  He denies dysuria, gross hematuria, flank pain     Hx 7/30/2024  Patient presents to the office for follow-up.  He obtained an updated PSA which returned at less  than 0.008 on 7/13/2024.  He states that he is overall doing well.  He denies any bothersome erectile dysfunction.  Mild stress urinary incontinence that he does not find to be particularly bothersome    AUA SYMPTOM SCORE      Flowsheet Row Most Recent Value   AUA SYMPTOM SCORE    How often have you had a sensation of not emptying your bladder completely after you finished urinating? 1 (P)     How often have you had to urinate again less than two hours after you finished urinating? 1 (P)     How often have you found you stopped and started again several times when you urinate? 0 (P)     How often have you found it difficult to postpone urination? 0 (P)     How often have you had a weak urinary stream? 1 (P)     How often have you had to push or strain to begin urination? 0 (P)     How many times did you most typically get up to urinate from the time you went to bed at night until the time you got up in the morning? 1 (P)     Quality of Life: If you were to spend the rest of your life with your urinary condition just the way it is now, how would you feel about that? 0 (P)     AUA SYMPTOM SCORE 4 (P)              ROS:   Review of Systems   Constitutional: Negative.  Negative for chills, fatigue and fever.   HENT: Negative.     Respiratory:  Negative for shortness of breath.    Cardiovascular:  Negative for chest pain.   Gastrointestinal: Negative.  Negative for abdominal pain.   Endocrine: Negative.    Musculoskeletal: Negative.    Skin: Negative.    Neurological: Negative.  Negative for dizziness and light-headedness.   Hematological: Negative.    Psychiatric/Behavioral: Negative.           Past Medical History  Past Medical History:   Diagnosis Date    Elevated PSA        Past Surgical History  Past Surgical History:   Procedure Laterality Date    [place] LAPS SURG [id number] RPBIC RAD W/NRV SPARING ROBOT N/A 4/27/2022    Procedure: PROSTATECTOMY RADICAL LAPAROSCOPIC  W ROBOTICS, BPLND;  Surgeon: Meng Crump MD;   Location:  MAIN OR;  Service: Urology    VASECTOMY      WISDOM TOOTH EXTRACTION         Past Family History  Family History   Problem Relation Age of Onset    No Known Problems Father     No Known Problems Mother        Past Social history  Social History     Socioeconomic History    Marital status: /Civil Union     Spouse name: Not on file    Number of children: Not on file    Years of education: Not on file    Highest education level: Not on file   Occupational History    Not on file   Tobacco Use    Smoking status: Never    Smokeless tobacco: Never   Vaping Use    Vaping status: Never Used   Substance and Sexual Activity    Alcohol use: Yes     Comment: occasionally    Drug use: Never    Sexual activity: Not on file   Other Topics Concern    Not on file   Social History Narrative    Not on file     Social Determinants of Health     Financial Resource Strain: Not on file   Food Insecurity: Not on file   Transportation Needs: Not on file   Physical Activity: Not on file   Stress: Not on file   Social Connections: Not on file   Intimate Partner Violence: Not on file   Housing Stability: Not on file       Current Medications  Current Outpatient Medications   Medication Sig Dispense Refill    Multiple Vitamin (MULTIVITAMIN ADULT PO) Take by mouth      omeprazole (PriLOSEC) 10 mg delayed release capsule Take 10 mg by mouth daily      bisacodyl (FLEET) 10 MG/30ML ENEM Insert 30 mL (10 mg total) into the rectum once for 1 dose Perform morning of biopsy (Patient not taking: Reported on 5/19/2022) 30 mL 0    docusate sodium (COLACE) 100 mg capsule Take 1 capsule (100 mg total) by mouth 2 (two) times a day for 14 days 28 capsule 0    HYDROcodone-acetaminophen (Norco) 5-325 mg per tablet Take 1 tablet by mouth every 6 (six) hours as needed for pain Max Daily Amount: 4 tablets (Patient not taking: Reported on 5/19/2022) 8 tablet 0    mometasone (ELOCON) 0.1 % cream as needed (Patient not taking: Reported on  "7/30/2024)      neomycin-bacitracin-polymyxin b (NEOSPORIN) ointment Apply to the tip of the penis twice daily as needed for catheter irritation. (Patient not taking: Reported on 5/19/2022) 15 g 0    ondansetron (ZOFRAN) 4 mg tablet Take 1 tablet (4 mg total) by mouth every 8 (eight) hours as needed for nausea or vomiting (Patient not taking: Reported on 5/19/2022) 5 tablet 0    scopolamine (TRANSDERM-SCOP) 1 mg/3 days TD 72 hr patch APPLY 1PATCH TO HAIRLESS AREA BEHIND 1 EAR AT LEAST 4HRS BEFORE NEEDED REAPPLY EVERY 3DAYS AS NEEDED (Patient not taking: Reported on 7/30/2024)      tadalafil (CIALIS) 5 MG tablet Take 1 tablet (5 mg total) by mouth daily as needed for erectile dysfunction (Patient not taking: Reported on 7/30/2024) 90 tablet 3     No current facility-administered medications for this visit.       Allergies  No Known Allergies    OBJECTIVE    Vitals   Vitals:    07/30/24 1536   BP: 140/80   BP Location: Left arm   Patient Position: Sitting   Cuff Size: Standard   Pulse: 59   SpO2: 95%   Weight: 86.6 kg (191 lb)   Height: 5' 6\" (1.676 m)       PVR:    Physical Exam  Constitutional:       General: He is not in acute distress.     Appearance: Normal appearance. He is normal weight. He is not ill-appearing or toxic-appearing.   HENT:      Head: Normocephalic and atraumatic.   Eyes:      Conjunctiva/sclera: Conjunctivae normal.   Cardiovascular:      Rate and Rhythm: Normal rate.   Pulmonary:      Effort: Pulmonary effort is normal. No respiratory distress.   Skin:     General: Skin is warm and dry.   Neurological:      General: No focal deficit present.      Mental Status: He is alert and oriented to person, place, and time.      Cranial Nerves: No cranial nerve deficit.   Psychiatric:         Mood and Affect: Mood normal.         Behavior: Behavior normal.         Thought Content: Thought content normal.          Labs:     Lab Results   Component Value Date    PSA <0.008 07/13/2024    PSA <0.04 01/15/2024 " "   PSA <0.04 05/05/2023    PSA 0.06 01/21/2023     Lab Results   Component Value Date    CREATININE 0.92 04/28/2022      No results found for: \"HGBA1C\"  Lab Results   Component Value Date    CALCIUM 8.4 04/28/2022    K 3.7 04/28/2022    CO2 27 04/28/2022     04/28/2022    BUN 10 04/28/2022    CREATININE 0.92 04/28/2022       I have personally reviewed all pertinent lab results and reviewed with patient    Imaging       Michael Esqueda PA-C  Date: 7/30/2024 Time: 3:41 PM  [facility]    This note was written using fluency dictation software. Please excuse any resulting minor grammatical errors.      "

## 2024-12-28 ENCOUNTER — APPOINTMENT (OUTPATIENT)
Dept: LAB | Facility: CLINIC | Age: 54
End: 2024-12-28
Payer: COMMERCIAL

## 2024-12-28 DIAGNOSIS — C61 PROSTATE CANCER (HCC): ICD-10-CM

## 2024-12-28 LAB — PSA SERPL-MCNC: <0.008 NG/ML (ref 0–4)

## 2024-12-28 PROCEDURE — 36415 COLL VENOUS BLD VENIPUNCTURE: CPT

## 2024-12-28 PROCEDURE — 84153 ASSAY OF PSA TOTAL: CPT

## 2025-01-02 ENCOUNTER — RESULTS FOLLOW-UP (OUTPATIENT)
Dept: UROLOGY | Facility: AMBULATORY SURGERY CENTER | Age: 55
End: 2025-01-02

## 2025-01-02 DIAGNOSIS — C61 PROSTATE CANCER (HCC): Primary | ICD-10-CM

## 2025-01-27 ENCOUNTER — HOSPITAL ENCOUNTER (OUTPATIENT)
Dept: RADIOLOGY | Age: 55
Discharge: HOME | End: 2025-01-27
Payer: COMMERCIAL

## 2025-01-27 DIAGNOSIS — R10.11 ABDOMINAL PAIN, RUQ: ICD-10-CM

## 2025-01-27 PROCEDURE — 76700 US EXAM ABDOM COMPLETE: CPT

## 2025-02-17 ENCOUNTER — OFFICE VISIT (OUTPATIENT)
Dept: SURGERY | Facility: CLINIC | Age: 55
End: 2025-02-17
Payer: COMMERCIAL

## 2025-02-17 VITALS
HEART RATE: 78 BPM | OXYGEN SATURATION: 95 % | SYSTOLIC BLOOD PRESSURE: 156 MMHG | WEIGHT: 190 LBS | DIASTOLIC BLOOD PRESSURE: 79 MMHG | HEIGHT: 66 IN | BODY MASS INDEX: 30.53 KG/M2

## 2025-02-17 DIAGNOSIS — K80.20 CHOLELITHIASIS: Primary | ICD-10-CM

## 2025-02-17 PROBLEM — K80.50 BILIARY COLIC: Status: ACTIVE | Noted: 2025-02-17

## 2025-02-17 PROCEDURE — 99243 OFF/OP CNSLTJ NEW/EST LOW 30: CPT | Performed by: SURGERY

## 2025-02-17 NOTE — ASSESSMENT & PLAN NOTE
Orders:    CBC and differential; Future    Comprehensive metabolic panel; Future    ECG 12 lead; Future

## 2025-02-17 NOTE — PROGRESS NOTES
Name: Michael Ramsay      : 1970      MRN: [id number]  Encounter Provider: Destin Minaya MD  Encounter Date: 2025   Encounter department: [facility] [place]  :  Assessment & Plan  Cholelithiasis    Orders:    CBC and differential; Future    Comprehensive metabolic panel; Future    ECG 12 lead; Future        History of Present Illness   Abdominal Pain  The current episode started more than 1 month ago. The problem occurs intermittently. The problem has been unchanged. The pain is located in the RUQ. The quality of the pain is sharp. The abdominal pain radiates to the back. The pain is aggravated by eating. The pain is relieved by Nothing. He has tried nothing for the symptoms. Prior diagnostic workup includes ultrasound. His past medical history is significant for gallstones.     Michael Ramsay is a 54 y.o. male who presents for gallbladder consult.  States he has had three episodes of RUQ pain in the past 8 months.  Ultrasound abdomen 2025 was reviewed  IMPRESSION:  1. Enlarged echogenic liver suggesting hepatic steatosis.  2. Cholelithiasis. Partially contracted with limited evaluation of gallbladder wall. Correlate with clinical symptoms. If cholecystitis is suspected, nuclear medicine HIDA scan may be considered.    I recommended laparoscopic cholecystectomy.  Risks and benefits explained in detail.  Patient is agreeable.      Review of Systems   Constitutional: Negative.  Negative for activity change.   HENT: Negative.     Eyes: Negative.    Respiratory: Negative.     Cardiovascular: Negative.    Gastrointestinal:  Positive for abdominal distention and abdominal pain.   Endocrine: Negative.    Genitourinary: Negative.    Musculoskeletal: Negative.    Skin: Negative.    Allergic/Immunologic: Negative.    Neurological: Negative.    Psychiatric/Behavioral:  Negative for agitation, behavioral problems and confusion. The patient is not nervous/anxious.           Objective   BP  "156/79   Pulse 78   Ht 5' 5.5\" (1.664 m)   Wt 86.2 kg (190 lb)   SpO2 95%   BMI 31.14 kg/m²      Physical Exam  Vitals and nursing note reviewed.   Constitutional:       General: He is not in acute distress.     Appearance: He is well-developed.   HENT:      Head: Normocephalic and atraumatic.   Eyes:      Conjunctiva/sclera: Conjunctivae normal.   Cardiovascular:      Rate and Rhythm: Normal rate and regular rhythm.      Heart sounds: No murmur heard.  Pulmonary:      Effort: Pulmonary effort is normal. No respiratory distress.      Breath sounds: Normal breath sounds.   Abdominal:      Palpations: Abdomen is soft.      Tenderness: There is no abdominal tenderness.   Musculoskeletal:         General: No swelling.      Cervical back: Neck supple.   Skin:     General: Skin is warm and dry.   Neurological:      Mental Status: He is alert.   Psychiatric:         Mood and Affect: Mood normal.           "

## 2025-02-18 ENCOUNTER — TELEPHONE (OUTPATIENT)
Dept: SURGERY | Facility: CLINIC | Age: 55
End: 2025-02-18

## 2025-02-18 ENCOUNTER — PREP FOR PROCEDURE (OUTPATIENT)
Dept: SURGERY | Facility: CLINIC | Age: 55
End: 2025-02-18

## 2025-02-18 ENCOUNTER — TELEPHONE (OUTPATIENT)
Age: 55
End: 2025-02-18

## 2025-02-18 DIAGNOSIS — K80.50 BILIARY COLIC: Primary | ICD-10-CM

## 2025-02-18 NOTE — TELEPHONE ENCOUNTER
Patient requested to speak with you to schedule surgery. Attempted warm transfer of the call but you were not available. Please call him at the work number [phone]. Thank you.

## 2025-02-19 ENCOUNTER — APPOINTMENT (OUTPATIENT)
Dept: LAB | Age: 55
End: 2025-02-19
Payer: COMMERCIAL

## 2025-02-19 DIAGNOSIS — K80.20 CHOLELITHIASIS: ICD-10-CM

## 2025-02-19 LAB
ALBUMIN SERPL BCG-MCNC: 4.5 G/DL (ref 3.5–5)
ALP SERPL-CCNC: 75 U/L (ref 34–104)
ALT SERPL W P-5'-P-CCNC: 43 U/L (ref 7–52)
ANION GAP SERPL CALCULATED.3IONS-SCNC: 7 MMOL/L (ref 4–13)
AST SERPL W P-5'-P-CCNC: 32 U/L (ref 13–39)
BASOPHILS # BLD AUTO: 0.05 THOUSANDS/ΜL (ref 0–0.1)
BASOPHILS NFR BLD AUTO: 1 % (ref 0–1)
BILIRUB SERPL-MCNC: 0.38 MG/DL (ref 0.2–1)
BUN SERPL-MCNC: 19 MG/DL (ref 5–25)
CALCIUM SERPL-MCNC: 9.2 MG/DL (ref 8.4–10.2)
CHLORIDE SERPL-SCNC: 105 MMOL/L (ref 96–108)
CO2 SERPL-SCNC: 26 MMOL/L (ref 21–32)
CREAT SERPL-MCNC: 0.95 MG/DL (ref 0.6–1.3)
EOSINOPHIL # BLD AUTO: 0.13 THOUSAND/ΜL (ref 0–0.61)
EOSINOPHIL NFR BLD AUTO: 1 % (ref 0–6)
ERYTHROCYTE [DISTWIDTH] IN BLOOD BY AUTOMATED COUNT: 11.8 % (ref 11.6–15.1)
GFR SERPL CREATININE-BSD FRML MDRD: 91 ML/MIN/1.73SQ M
GLUCOSE SERPL-MCNC: 77 MG/DL (ref 65–140)
HCT VFR BLD AUTO: 41.4 % (ref 36.5–49.3)
HGB BLD-MCNC: 13.9 G/DL (ref 12–17)
IMM GRANULOCYTES # BLD AUTO: 0.04 THOUSAND/UL (ref 0–0.2)
IMM GRANULOCYTES NFR BLD AUTO: 0 % (ref 0–2)
LYMPHOCYTES # BLD AUTO: 3.05 THOUSANDS/ΜL (ref 0.6–4.47)
LYMPHOCYTES NFR BLD AUTO: 34 % (ref 14–44)
MCH RBC QN AUTO: 29.4 PG (ref 26.8–34.3)
MCHC RBC AUTO-ENTMCNC: 33.6 G/DL (ref 31.4–37.4)
MCV RBC AUTO: 88 FL (ref 82–98)
MONOCYTES # BLD AUTO: 0.98 THOUSAND/ΜL (ref 0.17–1.22)
MONOCYTES NFR BLD AUTO: 11 % (ref 4–12)
NEUTROPHILS # BLD AUTO: 4.84 THOUSANDS/ΜL (ref 1.85–7.62)
NEUTS SEG NFR BLD AUTO: 53 % (ref 43–75)
NRBC BLD AUTO-RTO: 0 /100 WBCS
PLATELET # BLD AUTO: 239 THOUSANDS/UL (ref 149–390)
PMV BLD AUTO: 9.6 FL (ref 8.9–12.7)
POTASSIUM SERPL-SCNC: 4 MMOL/L (ref 3.5–5.3)
PROT SERPL-MCNC: 7.1 G/DL (ref 6.4–8.4)
RBC # BLD AUTO: 4.72 MILLION/UL (ref 3.88–5.62)
SODIUM SERPL-SCNC: 138 MMOL/L (ref 135–147)
WBC # BLD AUTO: 9.09 THOUSAND/UL (ref 4.31–10.16)

## 2025-02-19 PROCEDURE — 93005 ELECTROCARDIOGRAM TRACING: CPT

## 2025-02-19 PROCEDURE — 85025 COMPLETE CBC W/AUTO DIFF WBC: CPT

## 2025-02-19 PROCEDURE — 80053 COMPREHEN METABOLIC PANEL: CPT

## 2025-02-19 PROCEDURE — 36415 COLL VENOUS BLD VENIPUNCTURE: CPT

## 2025-02-20 LAB
ATRIAL RATE: 63 BPM
P AXIS: 18 DEGREES
PR INTERVAL: 144 MS
QRS AXIS: 18 DEGREES
QRSD INTERVAL: 80 MS
QT INTERVAL: 396 MS
QTC INTERVAL: 406 MS
T WAVE AXIS: 23 DEGREES
VENTRICULAR RATE: 63 BPM

## 2025-02-20 PROCEDURE — 93010 ELECTROCARDIOGRAM REPORT: CPT | Performed by: INTERNAL MEDICINE

## 2025-03-11 RX ORDER — OXYCODONE AND ACETAMINOPHEN 5; 325 MG/1; MG/1
1 TABLET ORAL EVERY 4 HOURS PRN
Qty: 10 TABLET | Refills: 0 | Status: SHIPPED | OUTPATIENT
Start: 2025-03-11

## 2025-03-12 RX ORDER — BROMPHENIRAMINE MALEATE, PSEUDOEPHEDRINE HYDROCHLORIDE, AND DEXTROMETHORPHAN HYDROBROMIDE 2; 30; 10 MG/5ML; MG/5ML; MG/5ML
SYRUP ORAL
COMMUNITY
Start: 2025-02-12

## 2025-03-12 NOTE — PRE-PROCEDURE INSTRUCTIONS
Pre-Surgery Instructions:   Medication Instructions    brompheniramine-pseudoephedrine-DM 30-2-10 MG/5ML syrup Uses PRN- DO NOT take day of surgery    Multiple Vitamin (MULTIVITAMIN ADULT PO) Stop taking 7 days prior to surgery.    omeprazole (PriLOSEC) 10 mg delayed release capsule Take day of surgery.     Spoke with pt via phone.    Medication instructions for day of surgery reviewed. Please take all instructed medications with only a sip of water.       You will receive a call one business day prior to surgery with an arrival time and hospital directions. If your surgery is scheduled on a Monday, the hospital will be calling you on the Friday prior to your surgery. If you have not heard from anyone by 8pm, please call the hospital supervisor through the hospital  at [phone]. ([place] [phone] or [place] [phone]).    Do not eat or drink anything after midnight the night before your surgery, including candy, mints, lifesavers, or chewing gum. Do not drink alcohol 24hrs before your surgery. Try not to smoke at least 24hrs before your surgery.       Follow the pre surgery showering instructions as listed in the “My Surgical Experience Booklet” or otherwise provided by your surgeon's office. Do not use a blade to shave the surgical area 1 week before surgery. It is okay to use a clean electric clippers up to 24 hours before surgery. Do not apply any lotions, creams, including makeup, cologne, deodorant, or perfumes after showering on the day of your surgery. Do not use dry shampoo, hair spray, hair gel, or any type of hair products.     No contact lenses, eye make-up, or artificial eyelashes. Remove nail polish, including gel polish, and any artificial, gel, or acrylic nails if possible. Remove all jewelry including rings and body piercing jewelry.     Wear causal clothing that is easy to take on and off. Consider your type of surgery.    Keep any valuables, jewelry, piercings at home. Please  bring any specially ordered equipment (sling, braces) if indicated.    Arrange for a responsible person to drive you to and from the hospital on the day of your surgery. Please confirm the visitor policy for the day of your procedure when you receive your phone call with an arrival time.     Call the surgeon's office with any new illnesses, exposures, or additional questions prior to surgery.    Please reference your “My Surgical Experience Booklet” for additional information to prepare for your upcoming surgery.

## 2025-03-19 ENCOUNTER — ANESTHESIA EVENT (OUTPATIENT)
Dept: PERIOP | Facility: HOSPITAL | Age: 55
End: 2025-03-19
Payer: COMMERCIAL

## 2025-03-19 ENCOUNTER — HOSPITAL ENCOUNTER (OUTPATIENT)
Facility: HOSPITAL | Age: 55
Setting detail: OUTPATIENT SURGERY
Discharge: HOME | End: 2025-03-19
Attending: SURGERY | Admitting: SURGERY
Payer: COMMERCIAL

## 2025-03-19 ENCOUNTER — ANESTHESIA (OUTPATIENT)
Dept: PERIOP | Facility: HOSPITAL | Age: 55
End: 2025-03-19
Payer: COMMERCIAL

## 2025-03-19 VITALS
BODY MASS INDEX: 30.53 KG/M2 | DIASTOLIC BLOOD PRESSURE: 80 MMHG | HEIGHT: 66 IN | HEART RATE: 69 BPM | TEMPERATURE: 97 F | WEIGHT: 190 LBS | OXYGEN SATURATION: 97 % | RESPIRATION RATE: 18 BRPM | SYSTOLIC BLOOD PRESSURE: 151 MMHG

## 2025-03-19 DIAGNOSIS — K80.50 BILIARY COLIC: ICD-10-CM

## 2025-03-19 DIAGNOSIS — K80.20 CHOLELITHIASIS: Primary | ICD-10-CM

## 2025-03-19 PROCEDURE — 47562 LAPAROSCOPIC CHOLECYSTECTOMY: CPT | Performed by: SURGERY

## 2025-03-19 PROCEDURE — 88304 TISSUE EXAM BY PATHOLOGIST: CPT | Performed by: STUDENT IN AN ORGANIZED HEALTH CARE EDUCATION/TRAINING PROGRAM

## 2025-03-19 RX ORDER — MIDAZOLAM HYDROCHLORIDE 2 MG/2ML
INJECTION, SOLUTION INTRAMUSCULAR; INTRAVENOUS AS NEEDED
Status: DISCONTINUED | OUTPATIENT
Start: 2025-03-19 | End: 2025-03-19

## 2025-03-19 RX ORDER — OXYCODONE AND ACETAMINOPHEN 5; 325 MG/1; MG/1
1 TABLET ORAL EVERY 4 HOURS PRN
Refills: 0 | Status: CANCELLED | OUTPATIENT
Start: 2025-03-19

## 2025-03-19 RX ORDER — GLYCOPYRROLATE 0.2 MG/ML
INJECTION INTRAMUSCULAR; INTRAVENOUS AS NEEDED
Status: DISCONTINUED | OUTPATIENT
Start: 2025-03-19 | End: 2025-03-19

## 2025-03-19 RX ORDER — DEXAMETHASONE SODIUM PHOSPHATE 10 MG/ML
INJECTION, SOLUTION INTRAMUSCULAR; INTRAVENOUS AS NEEDED
Status: DISCONTINUED | OUTPATIENT
Start: 2025-03-19 | End: 2025-03-19

## 2025-03-19 RX ORDER — SODIUM CHLORIDE, SODIUM LACTATE, POTASSIUM CHLORIDE, CALCIUM CHLORIDE 600; 310; 30; 20 MG/100ML; MG/100ML; MG/100ML; MG/100ML
INJECTION, SOLUTION INTRAVENOUS CONTINUOUS PRN
Status: DISCONTINUED | OUTPATIENT
Start: 2025-03-19 | End: 2025-03-19

## 2025-03-19 RX ORDER — BUPIVACAINE HYDROCHLORIDE AND EPINEPHRINE 2.5; 5 MG/ML; UG/ML
INJECTION, SOLUTION EPIDURAL; INFILTRATION; INTRACAUDAL; PERINEURAL AS NEEDED
Status: DISCONTINUED | OUTPATIENT
Start: 2025-03-19 | End: 2025-03-19 | Stop reason: HOSPADM

## 2025-03-19 RX ORDER — FENTANYL CITRATE/PF 50 MCG/ML
25 SYRINGE (ML) INJECTION
Status: DISCONTINUED | OUTPATIENT
Start: 2025-03-19 | End: 2025-03-19 | Stop reason: HOSPADM

## 2025-03-19 RX ORDER — ACETAMINOPHEN 325 MG/1
650 TABLET ORAL EVERY 4 HOURS PRN
Status: CANCELLED | OUTPATIENT
Start: 2025-03-19

## 2025-03-19 RX ORDER — SODIUM CHLORIDE, SODIUM LACTATE, POTASSIUM CHLORIDE, CALCIUM CHLORIDE 600; 310; 30; 20 MG/100ML; MG/100ML; MG/100ML; MG/100ML
125 INJECTION, SOLUTION INTRAVENOUS CONTINUOUS
Status: CANCELLED | OUTPATIENT
Start: 2025-03-19

## 2025-03-19 RX ORDER — FENTANYL CITRATE 50 UG/ML
INJECTION, SOLUTION INTRAMUSCULAR; INTRAVENOUS AS NEEDED
Status: DISCONTINUED | OUTPATIENT
Start: 2025-03-19 | End: 2025-03-19

## 2025-03-19 RX ORDER — ROCURONIUM BROMIDE 10 MG/ML
INJECTION, SOLUTION INTRAVENOUS AS NEEDED
Status: DISCONTINUED | OUTPATIENT
Start: 2025-03-19 | End: 2025-03-19

## 2025-03-19 RX ORDER — CEFAZOLIN SODIUM 2 G/50ML
2000 SOLUTION INTRAVENOUS ONCE
Status: COMPLETED | OUTPATIENT
Start: 2025-03-19 | End: 2025-03-19

## 2025-03-19 RX ORDER — DIPHENHYDRAMINE HYDROCHLORIDE 50 MG/ML
12.5 INJECTION, SOLUTION INTRAMUSCULAR; INTRAVENOUS ONCE AS NEEDED
Status: DISCONTINUED | OUTPATIENT
Start: 2025-03-19 | End: 2025-03-19 | Stop reason: HOSPADM

## 2025-03-19 RX ORDER — LIDOCAINE HYDROCHLORIDE 10 MG/ML
INJECTION, SOLUTION EPIDURAL; INFILTRATION; INTRACAUDAL; PERINEURAL AS NEEDED
Status: DISCONTINUED | OUTPATIENT
Start: 2025-03-19 | End: 2025-03-19

## 2025-03-19 RX ORDER — SODIUM CHLORIDE 9 MG/ML
INJECTION, SOLUTION INTRAVENOUS AS NEEDED
Status: DISCONTINUED | OUTPATIENT
Start: 2025-03-19 | End: 2025-03-19 | Stop reason: HOSPADM

## 2025-03-19 RX ORDER — HYDROMORPHONE HCL/PF 1 MG/ML
0.5 SYRINGE (ML) INJECTION
Status: CANCELLED | OUTPATIENT
Start: 2025-03-19

## 2025-03-19 RX ORDER — ONDANSETRON 2 MG/ML
INJECTION INTRAMUSCULAR; INTRAVENOUS AS NEEDED
Status: DISCONTINUED | OUTPATIENT
Start: 2025-03-19 | End: 2025-03-19

## 2025-03-19 RX ORDER — PROPOFOL 10 MG/ML
INJECTION, EMULSION INTRAVENOUS AS NEEDED
Status: DISCONTINUED | OUTPATIENT
Start: 2025-03-19 | End: 2025-03-19

## 2025-03-19 RX ORDER — HYDROMORPHONE HCL/PF 1 MG/ML
SYRINGE (ML) INJECTION AS NEEDED
Status: DISCONTINUED | OUTPATIENT
Start: 2025-03-19 | End: 2025-03-19

## 2025-03-19 RX ORDER — HYDROMORPHONE HCL IN WATER/PF 6 MG/30 ML
0.2 PATIENT CONTROLLED ANALGESIA SYRINGE INTRAVENOUS ONCE AS NEEDED
Status: DISCONTINUED | OUTPATIENT
Start: 2025-03-19 | End: 2025-03-19 | Stop reason: HOSPADM

## 2025-03-19 RX ORDER — ONDANSETRON 2 MG/ML
4 INJECTION INTRAMUSCULAR; INTRAVENOUS EVERY 4 HOURS PRN
Status: CANCELLED | OUTPATIENT
Start: 2025-03-19

## 2025-03-19 RX ORDER — KETOROLAC TROMETHAMINE 30 MG/ML
INJECTION, SOLUTION INTRAMUSCULAR; INTRAVENOUS AS NEEDED
Status: DISCONTINUED | OUTPATIENT
Start: 2025-03-19 | End: 2025-03-19

## 2025-03-19 RX ADMIN — KETOROLAC TROMETHAMINE 15 MG: 30 INJECTION, SOLUTION INTRAMUSCULAR; INTRAVENOUS at 11:23

## 2025-03-19 RX ADMIN — HYDROMORPHONE HYDROCHLORIDE 0.5 MG: 1 INJECTION, SOLUTION INTRAMUSCULAR; INTRAVENOUS; SUBCUTANEOUS at 11:17

## 2025-03-19 RX ADMIN — ROCURONIUM 10 MG: 50 INJECTION, SOLUTION INTRAVENOUS at 11:09

## 2025-03-19 RX ADMIN — SUGAMMADEX 200 MG: 100 INJECTION, SOLUTION INTRAVENOUS at 11:32

## 2025-03-19 RX ADMIN — SODIUM CHLORIDE, SODIUM LACTATE, POTASSIUM CHLORIDE, AND CALCIUM CHLORIDE: .6; .31; .03; .02 INJECTION, SOLUTION INTRAVENOUS at 11:06

## 2025-03-19 RX ADMIN — CEFAZOLIN SODIUM 2000 MG: 2 SOLUTION INTRAVENOUS at 10:15

## 2025-03-19 RX ADMIN — SODIUM CHLORIDE, SODIUM LACTATE, POTASSIUM CHLORIDE, AND CALCIUM CHLORIDE: .6; .31; .03; .02 INJECTION, SOLUTION INTRAVENOUS at 09:26

## 2025-03-19 RX ADMIN — LIDOCAINE HYDROCHLORIDE 50 MG: 10 INJECTION, SOLUTION EPIDURAL; INFILTRATION; INTRACAUDAL at 10:21

## 2025-03-19 RX ADMIN — PROPOFOL 220 MG: 10 INJECTION, EMULSION INTRAVENOUS at 10:21

## 2025-03-19 RX ADMIN — ROCURONIUM 50 MG: 50 INJECTION, SOLUTION INTRAVENOUS at 10:21

## 2025-03-19 RX ADMIN — ONDANSETRON 4 MG: 2 INJECTION INTRAMUSCULAR; INTRAVENOUS at 11:23

## 2025-03-19 RX ADMIN — MIDAZOLAM 2 MG: 1 INJECTION INTRAMUSCULAR; INTRAVENOUS at 10:14

## 2025-03-19 RX ADMIN — FENTANYL CITRATE 100 MCG: 50 INJECTION INTRAMUSCULAR; INTRAVENOUS at 10:21

## 2025-03-19 RX ADMIN — GLYCOPYRROLATE 0.1 MG: 0.2 INJECTION INTRAMUSCULAR; INTRAVENOUS at 10:28

## 2025-03-19 RX ADMIN — DEXAMETHASONE SODIUM PHOSPHATE 10 MG: 10 INJECTION INTRAMUSCULAR; INTRAVENOUS at 10:28

## 2025-03-19 NOTE — OP NOTE
OPERATIVE REPORT  PATIENT NAME: Michael Ramsay    :  1970  MRN: [id number]  Pt Location: AN OR ROOM 01    SURGERY DATE: 3/19/2025    Surgeons and Role:     * Destin Minaya MD - Primary     * Berlin Nolan MD    Preop Diagnosis:  Biliary colic [K80.50]    Post-Op Diagnosis Codes:     * Biliary colic [K80.50]     Umbilical hernia       Procedure(s):  CHOLECYSTECTOMY LAPAROSCOPIC  Umbilical hernia repair  Specimen(s):  ID Type Source Tests Collected by Time Destination   1 :  Tissue Gallbladder TISSUE EXAM Destin Minaya MD 3/19/2025 10:42 AM        Estimated Blood Loss:   50 mL    Drains:  * No LDAs found *    Anesthesia Type:   General    Operative Indications:  Biliary colic [K80.50]      Operative Findings:        Complications:   None    Procedure and Technique:  Michael Ramsay is a 54 y.o. male was brought into the operative suite and identified visually and by arm band. The patient was placed in the supine position.  Careful attention towards positioning of extremities was completed.  After sterile prep and drape a timeout was completed. Athrombic pumps in place.  Antibiotics provided.    After instillation of local analgesia an incision was made at the umbilicus hernia .  This measures 1.2 x 1.2 cm in size   With blunt dissection the peritoneum was identified.  This was pulled upward using Kocher clamps.  An incision was made.  Hemostat was used to bluntly puncture the peritoneum.  Intra-abdominal location was verified.  A trocar was then inserted. CO2 was then insufflated with a back pressure of 15. After Marcaine instillation at each additional site, additional trochars are placed under direct vision into the upper aspect of the abdomen.    Laparoscopic visualization revealed a normal liver and normal stomach.  No excess peritoneal fluid.    Gallbladder was chronically inflamed.  It was contracted.  Dense adhesions were present between the omentum and the gallbladder.  These were  dissected free.            The gallbladder was pulled with traction inferiorly, and the liver was pushed superior.  A dome down technique was completed using electrocautery. This technique carried down to the level of Jeronimo's pouch.  Careful attention was made towards location of the right hepatic artery, cystic artery, common bile duct, right hepatic duct and the cystic duct.    Careful attention was made towards the critical anatomy at this region. The cystic duct was identified inserting into the base of the gallbladder.  Cystic artery was identified also inserting into the base of the gallbladder.  The cystic duct was then clipped ×3 and divided. The cystic artery was clipped ×3 and divided. The gallbladder was then removed from the liver bed with additional electrocautery.    The area was copiously irrigated. Hemostasis was assured.  The gallbladder was placed into an Endo-Catch bag, and was then removed through the umbilical incision.      Umbilical hernia of fascia was closed with 0 Vicryl suture.  The subcutaneous components were irrigated.  The subcuticular incisions were then closed. Histacryl was then applied. The patient was awakened from general anesthesia and transferred to the recovery room in stable condition. Sponge and instrument count correct ×2.  A postoperative deep briefing was completed.     I was present for the entire procedure.    Patient Disposition:  PACU              SIGNATURE: Destin Minaya MD  DATE: March 19, 2025  TIME: 12:08 PM

## 2025-03-19 NOTE — QUICK NOTE
General Surgical Care   Michael Ramsay 54 y.o. male MRN: [id number]  Unit/Bed#: OR [place] Encounter: [id number]          ASSESSMENT: Biliary colic    PLAN: Laparoscopic cholecystectomy        Review of Systems  Constitutional:  Denies fever or chills   Eyes:  Denies change in visual acuity   HENT:  Denies nasal congestion or sore throat   Respiratory:  Denies cough or shortness of breath   Cardiovascular:  Denies chest pain or edema   GI:  Denies abdominal pain, nausea, vomiting, bloody stools or diarrhea   Musculoskeletal:  Denies back pain or joint pain   Integument:  Denies rash   Neurologic:  Denies headache, focal weakness or sensory changes   Endocrine:  Denies polyuria or polydipsia   Lymphatic:  Denies swollen glands   Psychiatric:  Denies depression or anxiety     Historical Information   Past Medical History:   Diagnosis Date    Cancer (HCC) 04/18/2022    Elevated PSA      Past Surgical History:   Procedure Laterality Date    COLONOSCOPY  11/21/2022    MOHS SURGERY  2024    RI LAPS SURG [id number] RPBIC RAD W/NRV SPARING ROBOT N/A 04/27/2022    Procedure: PROSTATECTOMY RADICAL LAPAROSCOPIC  W ROBOTICS, BPLND;  Surgeon: Meng Crump MD;  Location: [facility];  Service: Urology    VASECTOMY      WISDOM TOOTH EXTRACTION       Social History   Social History     Substance and Sexual Activity   Alcohol Use Not Currently    Comment: Every once in a while     Social History     Substance and Sexual Activity   Drug Use Never     Social History     Tobacco Use   Smoking Status Never   Smokeless Tobacco Never     Family History   Problem Relation Age of Onset    No Known Problems Father     No Known Problems Mother        Meds/Allergies       Medications Prior to Admission:     brompheniramine-pseudoephedrine-DM 30-2-10 MG/5ML syrup    Multiple Vitamin (MULTIVITAMIN ADULT PO)    omeprazole (PriLOSEC) 10 mg delayed release capsule    scopolamine (TRANSDERM-SCOP) 1 mg/3 days TD 72 hr patch    Current  "Facility-Administered Medications:     ceFAZolin (ANCEF) IVPB (premix in dextrose) 2,000 mg 50 mL, Once    Facility-Administered Medications Ordered in Other Encounters:     lactated ringers infusion, Continuous PRN    No Known Allergies    Objective     Blood pressure 156/83, pulse 60, temperature 97.7 °F (36.5 °C), temperature source Temporal, resp. rate 18, height 5' 5.5\" (1.664 m), weight 86.2 kg (190 lb), SpO2 99%.    No intake or output data in the 24 hours ending 03/19/25 1002    PHYSICAL EXAM  General appearance: Normal  Lungs: Clear  Heart: regular rate and rhythm, S1, S2 normal, no murmur, click, rub or gallop  Abdomen: soft, non-tender; bowel sounds normal; no masses,  no organomegaly  Skin: Skin color, texture, turgor normal. No rashes or lesions    Lab Results:   No visits with results within 1 Day(s) from this visit.   Latest known visit with results is:   Appointment on 02/19/2025   Component Date Value    Ventricular Rate 02/19/2025 63     Atrial Rate 02/19/2025 63     MI Interval 02/19/2025 144     QRSD Interval 02/19/2025 80     QT Interval 02/19/2025 396     QTC Interval 02/19/2025 406     P Axis 02/19/2025 18     QRS Axis 02/19/2025 18     T Wave [place] 02/19/2025 23      Imaging Studies:   No results found.     Counseling / Coordination of Care  Total time spent today  15 minutes. Greater than 50% of total time was spent with the patient and / or family counseling and / or coordination of care.       "

## 2025-03-19 NOTE — ANESTHESIA POSTPROCEDURE EVALUATION
Post-Op Assessment Note    CV Status:  Stable  Pain Score: 0    Pain management: adequate       Mental Status:  Arousable   Hydration Status:  Stable   PONV Controlled:  None   Airway Patency:  Patent     Post Op Vitals Reviewed: Yes    No anethesia notable event occurred.    Staff: CRNA           Last Filed PACU Vitals:  Vitals Value Taken Time   Temp 98    Pulse 82 03/19/25 1152   /76    Resp 18 03/19/25 1152   SpO2 95 % 03/19/25 1152   Vitals shown include unfiled device data.

## 2025-03-19 NOTE — ANESTHESIA PREPROCEDURE EVALUATION
Procedure:  CHOLECYSTECTOMY LAPAROSCOPIC (Abdomen)    Relevant Problems   /RENAL   (+) Prostate cancer (HCC)      Digestive   (+) Cholelithiasis      Surgery/Wound/Pain   (+) Biliary colic        Physical Exam    Airway    Mallampati score: II  TM Distance: >3 FB  Neck ROM: full     Dental       Cardiovascular      Pulmonary      Other Findings      Anesthesia Plan  ASA Score- 2     Anesthesia Type- general with ASA Monitors.         Additional Monitors:     Airway Plan: ETT.           Plan Factors-Exercise tolerance (METS): >4 METS.    Chart reviewed.    Patient summary reviewed.    Patient is not a current smoker.  Patient did not smoke on day of surgery.            Induction- intravenous.    Postoperative Plan- Plan for postoperative opioid use. Planned trial extubation    Perioperative Resuscitation Plan - Level 1 - Full Code.       Informed Consent- Anesthetic plan and risks discussed with patient.  I personally reviewed this patient with the CRNA. Discussed and agreed on the Anesthesia Plan with the CRNA..      NPO Status:  No vitals data found for the desired time range.

## 2025-03-27 PROCEDURE — 88304 TISSUE EXAM BY PATHOLOGIST: CPT | Performed by: STUDENT IN AN ORGANIZED HEALTH CARE EDUCATION/TRAINING PROGRAM

## 2025-04-07 ENCOUNTER — TELEPHONE (OUTPATIENT)
Dept: SURGERY | Facility: CLINIC | Age: 55
End: 2025-04-07

## 2025-04-07 NOTE — TELEPHONE ENCOUNTER
Left message for Michael to call the office regarding his appt 4/8.  Please transfer the call to the office.

## 2025-04-08 ENCOUNTER — OFFICE VISIT (OUTPATIENT)
Dept: SURGERY | Facility: CLINIC | Age: 55
End: 2025-04-08

## 2025-04-08 DIAGNOSIS — K80.20 CHOLELITHIASIS: Primary | ICD-10-CM

## 2025-04-08 PROCEDURE — 99024 POSTOP FOLLOW-UP VISIT: CPT | Performed by: SURGERY

## 2025-04-08 NOTE — PROGRESS NOTES
Name: Michael Ramsay      : 1970      MRN: [id number]  Encounter Provider: Destin Minaya MD  Encounter Date: 2025   Encounter department: [facility]  :  Assessment & Plan  Cholelithiasis             History of Present Illness   HPI  Michael Ramsay is a 54 y.o. male who presents post operatively.  Laparoscopic cholecystectomy 3/19/2025  Final Diagnosis   GALLBLADDER, CHOLECYSTECTOMY:    - Chronic cholecystitis with cholelithiasis.    - Negative for dysplasia or malignancy.        Patient returns for follow-up visit.  Overall he feels well.  He offers no complaints.  Incisions clean and healing well.  All questions answered.        Review of Systems       Objective   There were no vitals taken for this visit.     Physical Exam  Skin:     General: Skin is warm and dry.      Comments: Incisions are clean and healing well.  Abdomen is soft.

## 2025-07-26 ENCOUNTER — APPOINTMENT (OUTPATIENT)
Dept: LAB | Facility: CLINIC | Age: 55
End: 2025-07-26
Payer: COMMERCIAL

## 2025-07-26 DIAGNOSIS — C61 PROSTATE CANCER (HCC): ICD-10-CM

## 2025-07-26 LAB — PSA SERPL-MCNC: <0.008 NG/ML (ref 0–4)

## 2025-07-26 PROCEDURE — 36415 COLL VENOUS BLD VENIPUNCTURE: CPT

## 2025-07-26 PROCEDURE — 84153 ASSAY OF PSA TOTAL: CPT

## 2025-08-05 ENCOUNTER — OFFICE VISIT (OUTPATIENT)
Dept: UROLOGY | Facility: AMBULATORY SURGERY CENTER | Age: 55
End: 2025-08-05
Payer: COMMERCIAL

## 2025-08-05 VITALS
SYSTOLIC BLOOD PRESSURE: 124 MMHG | HEART RATE: 60 BPM | HEIGHT: 66 IN | OXYGEN SATURATION: 97 % | WEIGHT: 184 LBS | BODY MASS INDEX: 29.57 KG/M2 | DIASTOLIC BLOOD PRESSURE: 72 MMHG

## 2025-08-05 DIAGNOSIS — C61 PROSTATE CANCER (HCC): Primary | ICD-10-CM

## 2025-08-05 PROCEDURE — 99213 OFFICE O/P EST LOW 20 MIN: CPT

## (undated) DEVICE — SUT ETHILON 3-0 FS-1 18 IN 663G

## (undated) DEVICE — TISSUE RETRIEVAL SYSTEM: Brand: INZII RETRIEVAL SYSTEM

## (undated) DEVICE — 3000CC GUARDIAN II: Brand: GUARDIAN

## (undated) DEVICE — ANTIBACTERIAL UNDYED BRAIDED (POLYGLACTIN 910), SYNTHETIC ABSORBABLE SURGICAL SUTURE: Brand: COATED VICRYL

## (undated) DEVICE — SUT VLOC 90 3-0 90 CV-23 L9 IN VLOCM1944

## (undated) DEVICE — SUT PDS II 0 CT-1 27 IN Z340H

## (undated) DEVICE — CLIP APPLIER WITH CLIP LOGIC TECHNOLOGY: Brand: ENDO CLIP III

## (undated) DEVICE — GLOVE SRG BIOGEL ECLIPSE 7

## (undated) DEVICE — DECANTER: Brand: UNBRANDED

## (undated) DEVICE — CHLORAPREP HI-LITE 26ML ORANGE

## (undated) DEVICE — MONOPOLAR CURVED SCISSORS: Brand: ENDOWRIST

## (undated) DEVICE — INTENDED FOR TISSUE SEPARATION, AND OTHER PROCEDURES THAT REQUIRE A SHARP SURGICAL BLADE TO PUNCTURE OR CUT.: Brand: BARD-PARKER SAFETY BLADES SIZE 11, STERILE

## (undated) DEVICE — ANTIBACTERIAL VIOLET BRAIDED (POLYGLACTIN 910), SYNTHETIC ABSORBABLE SUTURE: Brand: COATED VICRYL

## (undated) DEVICE — LARGE NEEDLE DRIVER: Brand: ENDOWRIST

## (undated) DEVICE — GLOVE INDICATOR PI UNDERGLOVE SZ 8 BLUE

## (undated) DEVICE — VISUALIZATION SYSTEM: Brand: CLEARIFY

## (undated) DEVICE — FENESTRATED BIPOLAR FORCEPS: Brand: ENDOWRIST

## (undated) DEVICE — KERLIX BANDAGE ROLL: Brand: KERLIX

## (undated) DEVICE — LUBRICANT SURGILUBE TUBE 4 OZ  FLIP TOP

## (undated) DEVICE — UNDYED BRAIDED (POLYGLACTIN 910), SYNTHETIC ABSORBABLE SUTURE: Brand: COATED VICRYL

## (undated) DEVICE — KIT, BETHLEHEM ROBOTIC PROST: Brand: CARDINAL HEALTH

## (undated) DEVICE — ARM DRAPE

## (undated) DEVICE — LAPAROSCOPIC WIRE J-HOOK ELECTRODE COATED: Brand: CLEANCOAT

## (undated) DEVICE — LUBRICANT INST ELECTROLUBE ANTISTK WO PAD

## (undated) DEVICE — CATH FOLEY 18FR 5ML 2 WAY SILICONE ELASTIMER

## (undated) DEVICE — INSUFLATION TUBING INSUFLOW (LEXION)

## (undated) DEVICE — SUT MONOCRYL 4-0 PS-2 27 IN Y426H

## (undated) DEVICE — PACK PBDS LAP CHOLE RF

## (undated) DEVICE — TOWEL SURG XR DETECT GREEN STRL RFD

## (undated) DEVICE — HEM-O-LOK CLIP CARTRIDGE LARGE DA VINCI SI/XI

## (undated) DEVICE — ADHESIVE SKIN HIGH VISCOSITY EXOFIN 1ML

## (undated) DEVICE — AIRSEAL TUBE SMOKE EVAC LUMENX3 FILTERED

## (undated) DEVICE — HLDR INSTRMNT LAP-KIT LAPRSC 3.2CM X 30.5CM 4-POCKET W/TAPE - STERILE

## (undated) DEVICE — JP PERF DRN SIL FLT 10MM FULL: Brand: CARDINAL HEALTH

## (undated) DEVICE — TROCAR: Brand: KII FIOS FIRST ENTRY

## (undated) DEVICE — SYRINGE 10ML LL NEEDLE 21G X 1 NON SAFETY

## (undated) DEVICE — COLUMN DRAPE

## (undated) DEVICE — ENDOPATH PNEUMONEEDLE INSUFFLATION NEEDLES WITH LUER LOCK CONNECTORS 120MM: Brand: ENDOPATH

## (undated) DEVICE — SOFT SILICONE HYDROCELLULAR SACRUM DRESSING WITH LOCK AWAY LAYER: Brand: ALLEVYN LIFE SACRUM (LARGE) PACK OF 10

## (undated) DEVICE — STERILE LUBRICATING JELLY, TUBE: Brand: HR LUBRICATING JELLY

## (undated) DEVICE — TROCAR PORT ACCESS 12 X120MM W/BLDLS OPTICAL TIP AIRSEAL

## (undated) DEVICE — PROGRASP FORCEPS: Brand: ENDOWRIST

## (undated) DEVICE — GLOVE SRG BIOGEL ECLIPSE 7.5

## (undated) DEVICE — JACKSON-PRATT 100CC BULB RESERVOIR: Brand: CARDINAL HEALTH

## (undated) DEVICE — EXOFIN PRECISION PEN HIGH VISCOSITY TOPICAL SKIN ADHESIVE: Brand: EXOFIN PRECISION PEN, 1G

## (undated) DEVICE — METZENBAUM ADTEC SINGLE USE DISSECTING SCISSORS, SHAFT ONLY, MONOPOLAR, CURVED TO LEFT, WORKING LENGTH: 12 1/4", (310 MM), DIAM. 5 MM, INSULATED, DOUBLE ACTION, STERILE, DISPOSABLE, PACKAGE OF 10 PIECES: Brand: AESCULAP

## (undated) DEVICE — URIMETER 2500ML

## (undated) DEVICE — CANNULA SEAL

## (undated) DEVICE — DRAIN SPONGES,6 PLY: Brand: EXCILON

## (undated) DEVICE — SUT VLOC 90 3-0 CV-23 9 IN VLOCM0844

## (undated) DEVICE — TROCAR APPPLE 5MM EXTENDED LENGTH

## (undated) DEVICE — SURGICEL 4 X 8

## (undated) DEVICE — INTENDED FOR TISSUE SEPARATION, AND OTHER PROCEDURES THAT REQUIRE A SHARP SURGICAL BLADE TO PUNCTURE OR CUT.: Brand: BARD-PARKER SAFETY BLADES SIZE 15, STERILE

## (undated) DEVICE — ASTOUND STANDARD SURGICAL GOWN, XL: Brand: CONVERTORS

## (undated) DEVICE — TIP COVER ACCESSORY